# Patient Record
Sex: FEMALE | Race: WHITE | NOT HISPANIC OR LATINO | Employment: OTHER | URBAN - METROPOLITAN AREA
[De-identification: names, ages, dates, MRNs, and addresses within clinical notes are randomized per-mention and may not be internally consistent; named-entity substitution may affect disease eponyms.]

---

## 2021-02-13 ENCOUNTER — HOSPITAL ENCOUNTER (EMERGENCY)
Facility: HOSPITAL | Age: 76
Discharge: HOME | End: 2021-02-13
Attending: EMERGENCY MEDICINE | Admitting: EMERGENCY MEDICINE

## 2021-02-13 ENCOUNTER — APPOINTMENT (OUTPATIENT)
Dept: GENERAL RADIOLOGY | Facility: HOSPITAL | Age: 76
End: 2021-02-13

## 2021-02-13 VITALS
HEIGHT: 60 IN | OXYGEN SATURATION: 94 % | SYSTOLIC BLOOD PRESSURE: 146 MMHG | WEIGHT: 160 LBS | BODY MASS INDEX: 31.41 KG/M2 | HEART RATE: 88 BPM | RESPIRATION RATE: 17 BRPM | TEMPERATURE: 97.4 F | DIASTOLIC BLOOD PRESSURE: 73 MMHG

## 2021-02-13 DIAGNOSIS — S22.31XA CLOSED FRACTURE OF ONE RIB OF RIGHT SIDE, INITIAL ENCOUNTER: Primary | ICD-10-CM

## 2021-02-13 PROCEDURE — 71101 X-RAY EXAM UNILAT RIBS/CHEST: CPT

## 2021-02-13 PROCEDURE — 99282 EMERGENCY DEPT VISIT SF MDM: CPT

## 2021-02-13 NOTE — ED PROVIDER NOTES
EMERGENCY DEPARTMENT ENCOUNTER    Room Number:  39/39  Date seen:  2/13/2021  PCP: Moustapha Mata MD  Historian: patient      HPI:  Chief Complaint: right rib pain  A complete HPI/ROS/PMH/PSH/SH/FH are unobtainable due to: nothing  Context: Kristina Johnson is a 75 y.o. female who presents to the ED c/o pain in her right ribs that started last night when she was reaching down over the arm rest of her chair to get a Kleenex and felt a pop in her side.  The pain is sharp, constant.  The pain is worse with movement and with deep breathing.  She denies SOA.  Pain also worse with movement of her right arm.  She took a muscle relaxer and also a Norco 7.5 mg that she has for chronic neck pain which provided some relief of this pain.            PAST MEDICAL HISTORY  Active Ambulatory Problems     Diagnosis Date Noted   • No Active Ambulatory Problems     Resolved Ambulatory Problems     Diagnosis Date Noted   • No Resolved Ambulatory Problems     No Additional Past Medical History         PAST SURGICAL HISTORY  No past surgical history on file.      FAMILY HISTORY  No family history on file.      SOCIAL HISTORY  Social History     Socioeconomic History   • Marital status: Single     Spouse name: Not on file   • Number of children: Not on file   • Years of education: Not on file   • Highest education level: Not on file         ALLERGIES  Ace inhibitors        REVIEW OF SYSTEMS  Review of Systems   Review of all 14 systems is negative other than stated in the HPI above.      PHYSICAL EXAM  ED Triage Vitals   Temp Heart Rate Resp BP SpO2   02/13/21 1513 02/13/21 1511 02/13/21 1511 02/13/21 1531 02/13/21 1511   97.4 °F (36.3 °C) 92 18 123/66 94 %      Temp src Heart Rate Source Patient Position BP Location FiO2 (%)   02/13/21 1513 02/13/21 1531 02/13/21 1531 02/13/21 1531 --   Tympanic Monitor Lying Left arm          GENERAL: Awake and alert, no acute distress  HENT: nares patent  EYES: no scleral icterus  CV: regular  rhythm, normal rate  RESPIRATORY: normal effort, lungs clear to auscultation bilaterally  ABDOMEN: soft, nontender throughout  MUSCULOSKELETAL: no deformity.  There is point tenderness over the right fourth and fifth ribs in the midaxillary line with no soft tissue crepitance.  There is no pain with passive ranging of the right upper extremity.  NEURO: alert, moves all extremities, follows commands  PSYCH:  calm, cooperative  SKIN: warm, dry    Vital signs and nursing notes reviewed.          LAB RESULTS  No results found for this or any previous visit (from the past 24 hour(s)).    Ordered the above labs and reviewed the results.        RADIOLOGY  Xr Ribs Right With Pa Chest    Result Date: 2/13/2021  Chest radiograph  HISTORY:Right rib contusion and pain  TECHNIQUE: PA chest and multiple AP and oblique radiographs of the right ribs  COMPARISON:Chest radiograph 08/15/2012      FINDINGS AND IMPRESSION: Cervical fusion hardware is incompletely visualized. There is no pulmonary consolidation. No pneumothorax or pleural effusion is seen. There are upper quadrant surgical clips. Cardiac silhouette is within normal limits for size. Dextrocurvature the lumbar spine is present. Subtle cortical irregularity along the anterolateral aspect the right eighth rib is present and possibly representing a minimally displaced fracture. Correlation with point tenderness is recommended.  This report was finalized on 2/13/2021 4:55 PM by Dr. Bartolo Vicente M.D.        Ordered the above noted radiological studies. Reviewed by me in PACS.            PROCEDURES  Procedures            MEDICATIONS GIVEN IN ER  Medications - No data to display                MEDICAL DECISION MAKING, PROGRESS, and CONSULTS    All labs have been independently reviewed by me.  All radiology studies have been reviewed by me and discussed with radiologist dictating the report.   EKG's independently viewed and interpreted by me.  Discussion below represents my  analysis of pertinent findings related to patient's condition, differential diagnosis, treatment plan and final disposition.    Differential diagnosis includes intercostal muscle strain, rib fracture, pneumothorax, hemothorax.    ED Course as of Feb 13 1859   Sat Feb 13, 2021   1736 Patient with a single right rib fracture.  There is no evidence of consolidation on the rib series.  She will be provided with a incentive spirometer.  She already has Norco at home.  Instructed to take NSAIDs in addition as needed for pain.  Return precautions were discussed.    [JR]      ED Course User Index  [JR] Jaspreet Henley MD              I wore an N95 mask, face shield, and gloves during this patient encounter.  Patient also wearing a surgical mask.  Hand hygeine performed before and after seeing the patient.    DIAGNOSIS  Final diagnoses:   Closed fracture of one rib of right side, initial encounter         DISPOSITION  DISCHARGE    Patient discharged in stable condition.    Reviewed implications of results, diagnosis, meds, responsibility to follow up, warning signs and symptoms of possible worsening, potential complications and reasons to return to ER.    Patient/Family voiced understanding of above instructions.    Discussed plan for discharge, as there is no emergent indication for admission. Patient referred to primary care provider for BP management due to today's BP. Pt/family is agreeable and understands need for follow up and repeat testing.  Pt is aware that discharge does not mean that nothing is wrong but it indicates no emergency is present that requires admission and they must continue care with follow-up as given below or physician of their choice.     FOLLOW-UP  Moustapha Mata MD  [address] [id number] [phone]    Schedule an appointment as soon as possible for a visit            Medication List      No changes were made to your prescriptions during this visit.                    Latest Documented Vital Signs:  As of 18:59 EST  BP- 146/73 HR- 88 Temp- 97.4 °F (36.3 °C) (Tympanic) O2 sat- 94%        --    Please note that portions of this were completed with a voice recognition program.          Jaspreet Henley MD  02/13/21 9805

## 2021-02-13 NOTE — DISCHARGE INSTRUCTIONS
Continue to take your home pain medication.  You may also take ibuprofen in addition if needed.  Take deep breaths at least once an hour to reduce risk of pneumonia.  Return to the ER for shortness of breath or fever.

## 2021-02-13 NOTE — ED TRIAGE NOTES
Pt reports right shoulder pain after reaching down last night for kleenex and felt a pop. Pt states pain radiates into rt chest. Pt states pain worse when moving arm. Patient masked in first look triage. I was wearing mask and goggles.

## 2021-04-11 ENCOUNTER — HOSPITAL ENCOUNTER (EMERGENCY)
Facility: HOSPITAL | Age: 76
Discharge: HOME | End: 2021-04-11
Attending: EMERGENCY MEDICINE | Admitting: EMERGENCY MEDICINE

## 2021-04-11 VITALS
HEIGHT: 60 IN | OXYGEN SATURATION: 94 % | RESPIRATION RATE: 18 BRPM | TEMPERATURE: 97.6 F | HEART RATE: 85 BPM | BODY MASS INDEX: 31.25 KG/M2

## 2021-04-11 DIAGNOSIS — H81.12 BENIGN PAROXYSMAL POSITIONAL VERTIGO OF LEFT EAR: Primary | ICD-10-CM

## 2021-04-11 PROCEDURE — 99283 EMERGENCY DEPT VISIT LOW MDM: CPT

## 2021-04-11 PROCEDURE — 63710000001 DEXAMETHASONE PER 0.25 MG: Performed by: EMERGENCY MEDICINE

## 2021-04-11 RX ORDER — MECLIZINE HYDROCHLORIDE 25 MG/1
TABLET ORAL
Qty: 30 TABLET | Refills: 0 | OUTPATIENT
Start: 2021-04-11

## 2021-04-11 RX ORDER — MECLIZINE HYDROCHLORIDE 25 MG/1
25 TABLET ORAL ONCE
Status: COMPLETED | OUTPATIENT
Start: 2021-04-11 | End: 2021-04-11

## 2021-04-11 RX ORDER — DEXAMETHASONE 4 MG/1
8 TABLET ORAL ONCE
Status: COMPLETED | OUTPATIENT
Start: 2021-04-11 | End: 2021-04-11

## 2021-04-11 RX ORDER — MECLIZINE HYDROCHLORIDE 25 MG/1
TABLET ORAL
Qty: 30 TABLET | Refills: 0 | OUTPATIENT
Start: 2021-04-11 | End: 2021-04-11 | Stop reason: SDUPTHER

## 2021-04-11 RX ADMIN — DEXAMETHASONE 8 MG: 4 TABLET ORAL at 17:24

## 2021-04-11 RX ADMIN — MECLIZINE HYDROCHLORIDE 25 MG: 25 TABLET ORAL at 17:24

## 2021-04-11 NOTE — ED PROVIDER NOTES
EMERGENCY DEPARTMENT ENCOUNTER    Room Number:  10/10  Date of encounter:  4/11/2021  PCP: Moustapha Mata MD  Historian: Patient    Patient was placed in face mask during triage process. Patient was wearing facemask when I entered the room and throughout our encounter. I wore full protective equipment throughout this patient encounter including a face mask, eye protection, and gloves. Hand hygiene was performed before donning protective equipment and again following doffing of PPE after leaving the room.    HPI:  Chief Complaint: Dizziness  A complete HPI/ROS/PMH/PSH/SH/FH are unobtainable due to: N/A   Context: Kristina Johnson is a 76 y.o. female who presents to the ED c/o intermittent dizziness with rapid rotation of the head or rapid change in position.  Patient notes over the last 2 days she has had gradual and intermittent dizziness where the world spins a little bit when she moves her head rapidly.  Patient has a history of recurrence of these episodes especially in the spring when her allergies kick up.  Patient has noted some full sensation in her left ear greater than right but denies any headache, significant hearing change, vision change, focal numbness or weakness.  Patient is able to ambulate with stable gait at home and in fact drove herself to the emergency department for evaluation today.  She notes they usually give her an oral tablet and this helps.  She denies any cough, fever, dyspnea, chest pain, vomiting, abdominal pain, dysuria, hematuria, black or bloody stools.      MEDICAL HISTORY REVIEW  Seen in this emergency department 2/13/2021 and diagnosed with closed rib fractures.    PAST MEDICAL HISTORY  Active Ambulatory Problems     Diagnosis Date Noted   • No Active Ambulatory Problems     Resolved Ambulatory Problems     Diagnosis Date Noted   • No Resolved Ambulatory Problems     No Additional Past Medical History         PAST SURGICAL HISTORY  No past surgical history on file.      FAMILY  HISTORY  No family history on file.      SOCIAL HISTORY  Social History     Socioeconomic History   • Marital status: Single     Spouse name: Not on file   • Number of children: Not on file   • Years of education: Not on file   • Highest education level: Not on file         ALLERGIES  Ace inhibitors        REVIEW OF SYSTEMS  Review of Systems     All systems reviewed and negative except for those discussed in HPI.       PHYSICAL EXAM    I have reviewed the triage vital signs and nursing notes.    ED Triage Vitals [04/11/21 1553]   Temp Heart Rate Resp BP SpO2   97.6 °F (36.4 °C) 85 18 -- 94 %      Temp src Heart Rate Source Patient Position BP Location FiO2 (%)   -- -- -- -- --       Physical Exam    Physical Exam   Constitutional: No distress.   HENT: Bilateral serous otitis that is very mild noted.  No distention of the TMs or erythema.  Head: Normocephalic and atraumatic.   Oropharynx: Mucous membranes are moist.   Eyes: No scleral icterus. No conjunctival pallor.  Smooth pursuit.  Neck: Painless range of motion noted. Neck supple.   Cardiovascular: Normal rate, regular rhythm and intact distal pulses.  Pulmonary/Chest: No respiratory distress. There are no wheezes, no rhonchi, and no rales.   Abdominal: Soft. There is no tenderness. There is no rebound and no guarding.   Musculoskeletal: Moves all extremities equally. There is no pedal edema or calf tenderness.   Neurological: Alert.  Baseline strength and sensation noted.   NIH Stroke Scale      Interval: Baseline  Time: 16:48 EDT  Person Administering Scale: Aubrey Simposn MD    Administer stroke scale items in the order listed. Record performance in each category after each subscale exam. Do not go back and change scores. Follow directions provided for each exam technique. Scores should reflect what the patient does, not what the clinician thinks the patient can do. The clinician should record answers while administering the exam and work quickly. Except  where indicated, the patient should not be coached (i.e., repeated requests to patient to make a special effort).      1a  Level of consciousness: 0=alert; keenly responsive   1b. LOC questions:  0=Performs both tasks correctly   1c. LOC commands: 0=Performs both tasks correctly   2.  Best Gaze: 0=normal   3.  Visual: 0=No visual loss   4. Facial Palsy: 0=Normal symmetric movement   5a.  Motor left arm: 0=No drift, limb holds 90 (or 45) degrees for full 10 seconds   5b.  Motor right arm: 0=No drift, limb holds 90 (or 45) degrees for full 10 seconds   6a. motor left le=No drift, limb holds 90 (or 45) degrees for full 10 seconds   6b  Motor right le=No drift, limb holds 90 (or 45) degrees for full 10 seconds   7. Limb Ataxia: 0=Absent   8.  Sensory: 0=Normal; no sensory loss   9. Best Language:  0=No aphasia, normal   10. Dysarthria: 0=Normal   11. Extinction and Inattention: 0=No abnormality   12. Distal motor function: 0=Normal    Total:   0     Skin: Skin is pink, warm, and dry. No pallor.   Psychiatric: Mood and affect normal.   Nursing note and vitals reviewed.    LAB RESULTS  No results found for this or any previous visit (from the past 24 hour(s)).    Ordered the above labs and independently reviewed the results.        RADIOLOGY  No Radiology Exams Resulted Within Past 24 Hours    I ordered the above noted radiological studies. Reviewed by me and discussed with radiologist.  See dictation for official radiology interpretation.      PROCEDURES    Procedures        MEDICATIONS GIVEN IN ER    Medications   dexamethasone (DECADRON) tablet 8 mg (has no administration in time range)   meclizine (ANTIVERT) tablet 25 mg (has no administration in time range)         PROGRESS, DATA ANALYSIS, CONSULTS, AND MEDICAL DECISION MAKING    My differential diagnosis for dizziness included but is not limited to:  Labyrinthitis, vertigo, concussion, intracranial hemorrhage, stroke, migraine headache, cardiac arrhythmias,  acute MI, hypotension, hypertension, hypoxia, inner ear and middle ear infections, anemia, electrolyte abnormalities, dehydration, hyperventilation and anxiety attacks..      All labs have been independently reviewed by me.  All radiology studies have been reviewed by me and discussed with radiologist dictating the report.   EKG's independently viewed and interpreted by me.  Discussion below represents my analysis of pertinent findings related to patient's condition, differential diagnosis, treatment plan and final disposition.      ED Course as of Apr 11 1648   Sun Apr 11, 2021   1626 Patient with very mild symptoms at this time.  No indication for laboratory or radiologic evaluation.  ENT follow-up recommended.  Patient agreeable.    [RS]      ED Course User Index  [RS] Aubrey Simpson MD       AS OF 16:48 EDT VITALS:    BP -    HR - 85  TEMP - 97.6 °F (36.4 °C)  O2 SATS - 94%        DIAGNOSIS  Final diagnoses:   Benign paroxysmal positional vertigo of left ear         DISPOSITION  DISCHARGE    Patient discharged in stable condition.    Reviewed implications of results, diagnosis, meds, responsibility to follow up, warning signs and symptoms of possible worsening, potential complications and reasons to return to ER.    Patient/Family voiced understanding of above instructions.    Discussed plan for discharge, as there is no emergent indication for admission. Patient referred to primary care provider for regular health maintenance. Pt/family is agreeable and understands need for follow up and possible repeat testing.  Pt is aware that discharge does not mean that nothing is wrong but it indicates no emergency is present that requires admission and they must continue care with follow-up as given below or physician of their choice.     FOLLOW-UP  Moustapha Mata MD  [address] [id number] [phone]    Schedule an appointment as soon as possible for a visit   As needed    Jeannine Benedict  MD YARIEL  [address] [facility] [address]  [phone]    Schedule an appointment as soon as possible for a visit in 2 days  For further evaluation and treatment of your positional vertigo         Medication List      New Prescriptions    meclizine 25 MG tablet  Commonly known as: ANTIVERT  Take 1 tablet by mouth 3 times daily as needed for dizziness or nausea           Where to Get Your Medications      You can get these medications from any pharmacy    Bring a paper prescription for each of these medications  · meclizine 25 MG tablet            Aubrey Simpson MD  04/11/21 4238

## 2021-04-11 NOTE — ED TRIAGE NOTES
Pt comes to ER for dizziness x2 days that is made worse with movement and standing up. Pt denies any blurred vision, slurred speech, or headache. Pt and RN wearing mask upon triage.

## 2023-06-11 ENCOUNTER — APPOINTMENT (OUTPATIENT)
Dept: GENERAL RADIOLOGY | Facility: HOSPITAL | Age: 78
End: 2023-06-11
Payer: MEDICARE

## 2023-06-11 ENCOUNTER — APPOINTMENT (OUTPATIENT)
Dept: CT IMAGING | Facility: HOSPITAL | Age: 78
End: 2023-06-11
Payer: MEDICARE

## 2023-06-11 ENCOUNTER — HOSPITAL ENCOUNTER (EMERGENCY)
Facility: HOSPITAL | Age: 78
Discharge: HOME | End: 2023-06-11
Attending: EMERGENCY MEDICINE | Admitting: EMERGENCY MEDICINE
Payer: MEDICARE

## 2023-06-11 VITALS
WEIGHT: 150 LBS | BODY MASS INDEX: 28.32 KG/M2 | DIASTOLIC BLOOD PRESSURE: 86 MMHG | SYSTOLIC BLOOD PRESSURE: 159 MMHG | TEMPERATURE: 99.1 F | HEIGHT: 61 IN | OXYGEN SATURATION: 96 % | HEART RATE: 85 BPM | RESPIRATION RATE: 18 BRPM

## 2023-06-11 DIAGNOSIS — S16.1XXA STRAIN OF NECK MUSCLE, INITIAL ENCOUNTER: ICD-10-CM
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
## 2023-06-11 DIAGNOSIS — S46.912A STRAIN OF LEFT SHOULDER, INITIAL ENCOUNTER: ICD-10-CM

## 2023-06-11 LAB
ANION GAP SERPL CALCULATED.3IONS-SCNC: 7 MMOL/L (ref 5–15)
BASOPHILS # BLD AUTO: 0.03 10*3/MM3 (ref 0–0.2)
BASOPHILS NFR BLD AUTO: 0.3 % (ref 0–1.5)
BUN SERPL-MCNC: 22 MG/DL (ref 8–23)
BUN/CREAT SERPL: 18 (ref 7–25)
CALCIUM SPEC-SCNC: 9.2 MG/DL (ref 8.6–10.5)
CHLORIDE SERPL-SCNC: 110 MMOL/L (ref 98–107)
CO2 SERPL-SCNC: 28 MMOL/L (ref 22–29)
CREAT SERPL-MCNC: 1.22 MG/DL (ref 0.57–1)
DEPRECATED RDW RBC AUTO: 43 FL (ref 37–54)
EGFRCR SERPLBLD CKD-EPI 2021: 46.1 ML/MIN/1.73
EOSINOPHIL # BLD AUTO: 0.19 10*3/MM3 (ref 0–0.4)
EOSINOPHIL NFR BLD AUTO: 2.2 % (ref 0.3–6.2)
ERYTHROCYTE [DISTWIDTH] IN BLOOD BY AUTOMATED COUNT: 12.3 % (ref 12.3–15.4)
GLUCOSE SERPL-MCNC: 150 MG/DL (ref 65–99)
HCT VFR BLD AUTO: 38.9 % (ref 34–46.6)
HGB BLD-MCNC: 12.5 G/DL (ref 12–15.9)
IMM GRANULOCYTES # BLD AUTO: 0.15 10*3/MM3 (ref 0–0.05)
IMM GRANULOCYTES NFR BLD AUTO: 1.7 % (ref 0–0.5)
LYMPHOCYTES # BLD AUTO: 2.16 10*3/MM3 (ref 0.7–3.1)
LYMPHOCYTES NFR BLD AUTO: 25.1 % (ref 19.6–45.3)
MCH RBC QN AUTO: 30.3 PG (ref 26.6–33)
MCHC RBC AUTO-ENTMCNC: 32.1 G/DL (ref 31.5–35.7)
MCV RBC AUTO: 94.4 FL (ref 79–97)
MONOCYTES # BLD AUTO: 0.51 10*3/MM3 (ref 0.1–0.9)
MONOCYTES NFR BLD AUTO: 5.9 % (ref 5–12)
NEUTROPHILS NFR BLD AUTO: 5.55 10*3/MM3 (ref 1.7–7)
NEUTROPHILS NFR BLD AUTO: 64.8 % (ref 42.7–76)
NRBC BLD AUTO-RTO: 0 /100 WBC (ref 0–0.2)
PLATELET # BLD AUTO: 193 10*3/MM3 (ref 140–450)
PMV BLD AUTO: 10.6 FL (ref 6–12)
POTASSIUM SERPL-SCNC: 4.6 MMOL/L (ref 3.5–5.2)
QT INTERVAL: 351 MS
RBC # BLD AUTO: 4.12 10*6/MM3 (ref 3.77–5.28)
SODIUM SERPL-SCNC: 145 MMOL/L (ref 136–145)
TROPONIN T SERPL HS-MCNC: 13 NG/L
TROPONIN T SERPL HS-MCNC: 13 NG/L
WBC NRBC COR # BLD: 8.59 10*3/MM3 (ref 3.4–10.8)

## 2023-06-11 PROCEDURE — 73590 X-RAY EXAM OF LOWER LEG: CPT

## 2023-06-11 PROCEDURE — 70450 CT HEAD/BRAIN W/O DYE: CPT

## 2023-06-11 PROCEDURE — 73030 X-RAY EXAM OF SHOULDER: CPT

## 2023-06-11 PROCEDURE — 93010 ELECTROCARDIOGRAM REPORT: CPT | Performed by: INTERNAL MEDICINE

## 2023-06-11 PROCEDURE — 72125 CT NECK SPINE W/O DYE: CPT

## 2023-06-11 PROCEDURE — 99283 EMERGENCY DEPT VISIT LOW MDM: CPT

## 2023-06-11 PROCEDURE — 93005 ELECTROCARDIOGRAM TRACING: CPT | Performed by: EMERGENCY MEDICINE

## 2023-06-11 PROCEDURE — 99282 EMERGENCY DEPT VISIT SF MDM: CPT

## 2023-06-11 PROCEDURE — 36415 COLL VENOUS BLD VENIPUNCTURE: CPT

## 2023-06-11 PROCEDURE — 84484 ASSAY OF TROPONIN QUANT: CPT | Performed by: EMERGENCY MEDICINE

## 2023-06-11 PROCEDURE — 80048 BASIC METABOLIC PNL TOTAL CA: CPT | Performed by: EMERGENCY MEDICINE

## 2023-06-11 PROCEDURE — 85025 COMPLETE CBC W/AUTO DIFF WBC: CPT | Performed by: EMERGENCY MEDICINE

## 2023-06-11 RX ORDER — LOSARTAN POTASSIUM 100 MG/1
TABLET ORAL
COMMUNITY

## 2023-06-11 RX ORDER — ATORVASTATIN CALCIUM 20 MG/1
TABLET, FILM COATED ORAL
COMMUNITY
Start: 2021-04-09

## 2023-06-11 RX ORDER — DAPAGLIFLOZIN 10 MG/1
TABLET, FILM COATED ORAL
COMMUNITY

## 2023-06-11 RX ORDER — CLONAZEPAM 1 MG/1
TABLET ORAL
COMMUNITY

## 2023-06-11 RX ORDER — CELECOXIB 200 MG/1
CAPSULE ORAL
COMMUNITY

## 2023-06-11 RX ORDER — BACLOFEN 10 MG/1
TABLET ORAL
COMMUNITY

## 2023-06-11 RX ORDER — DULOXETIN HYDROCHLORIDE 30 MG/1
CAPSULE, DELAYED RELEASE ORAL
COMMUNITY

## 2023-06-11 RX ORDER — GABAPENTIN 800 MG/1
TABLET ORAL
COMMUNITY

## 2023-06-11 RX ORDER — CEPHALEXIN 500 MG/1
CAPSULE ORAL
COMMUNITY
End: 2023-06-13

## 2023-06-11 RX ORDER — SULFAMETHOXAZOLE AND TRIMETHOPRIM 800; 160 MG/1; MG/1
TABLET ORAL
COMMUNITY
End: 2023-06-13

## 2023-06-11 RX ORDER — METFORMIN HYDROCHLORIDE 500 MG/1
TABLET, EXTENDED RELEASE ORAL
COMMUNITY

## 2023-06-11 RX ORDER — RALOXIFENE HYDROCHLORIDE 60 MG/1
TABLET, FILM COATED ORAL
COMMUNITY

## 2023-06-11 NOTE — ED NOTES
Pt arrives ambulatory to triage for a fall yesterday and a fall earlier in the week. Pt fell the first time because she was dizzy. Pt fell the second time while she was trying to tie her shoes. Pt reports hitting her head both times and reports neck pain. Pt denies LOC or blood thinners.

## 2023-06-11 NOTE — ED PROVIDER NOTES
EMERGENCY DEPARTMENT ENCOUNTER    Room Number:  13/13  Date seen:  6/11/2023  PCP: Moustapha Mata MD      HPI:  Chief Complaint: Fall  A complete HPI/ROS/PMH/PSH/SH/FH are unobtainable due to: Amnesia to the events  Context: Kristina Johnson is a 78 y.o. female who presents to the ED c/o fall.  She had 2 falls.  First fall was about a week ago and second fall was yesterday.  The first fall, she was looking up when she became lightheaded and then hit her head.  During the fall yesterday she bent over to get her shoes.  She does not recall the events but does not think she had any prodromal symptoms.  She states that she then fell forward onto the ground.  She complains of head pain as well as right neck pain.  She also notes that she has bruising to her left shoulder and skinned up her bilateral knees.          PAST MEDICAL HISTORY  Active Ambulatory Problems     Diagnosis Date Noted    No Active Ambulatory Problems     Resolved Ambulatory Problems     Diagnosis Date Noted    No Resolved Ambulatory Problems     Past Medical History:   Diagnosis Date    Diabetes mellitus          PAST SURGICAL HISTORY  History reviewed. No pertinent surgical history.      FAMILY HISTORY  History reviewed. No pertinent family history.      SOCIAL HISTORY  Social History     Socioeconomic History    Marital status: Single         ALLERGIES  Codeine and Ace inhibitors        REVIEW OF SYSTEMS  Review of Systems     All systems reviewed and negative except for those discussed in HPI.       PHYSICAL EXAM  ED Triage Vitals [06/11/23 1616]   Temp Heart Rate Resp BP SpO2   99.1 °F (37.3 °C) 97 18 128/94 97 %      Temp src Heart Rate Source Patient Position BP Location FiO2 (%)   Oral Monitor -- -- --       Physical Exam      GENERAL: no acute distress  HENT: nares patent  EYES: no scleral icterus  CV: regular rhythm, normal rate  RESPIRATORY: normal effort  ABDOMEN: soft, nontender  MUSCULOSKELETAL: no deformity, no C/T/L-spine tenderness,  she does have pain to the right lateral neck musculature that is very superficial, no pain to palpation of the 4 extremities, no chest wall tenderness  NEURO: alert, moves all extremities, follows commands  PSYCH:  calm, cooperative  SKIN: warm, dry, bruising to the left shoulder    Vital signs and nursing notes reviewed.          LAB RESULTS  Recent Results (from the past 24 hour(s))   Basic Metabolic Panel    Collection Time: 06/11/23  6:06 PM    Specimen: Blood   Result Value Ref Range    Glucose 150 (H) 65 - 99 mg/dL    BUN 22 8 - 23 mg/dL    Creatinine 1.22 (H) 0.57 - 1.00 mg/dL    Sodium 145 136 - 145 mmol/L    Potassium 4.6 3.5 - 5.2 mmol/L    Chloride 110 (H) 98 - 107 mmol/L    CO2 28.0 22.0 - 29.0 mmol/L    Calcium 9.2 8.6 - 10.5 mg/dL    BUN/Creatinine Ratio 18.0 7.0 - 25.0    Anion Gap 7.0 5.0 - 15.0 mmol/L    eGFR 46.1 (L) >60.0 mL/min/1.73   Single High Sensitivity Troponin T    Collection Time: 06/11/23  6:06 PM    Specimen: Blood   Result Value Ref Range    HS Troponin T 13 (H) <10 ng/L   High Sensitivity Troponin T    Collection Time: 06/11/23  6:06 PM    Specimen: Blood   Result Value Ref Range    HS Troponin T 13 (H) <10 ng/L   CBC Auto Differential    Collection Time: 06/11/23  6:06 PM    Specimen: Blood   Result Value Ref Range    WBC 8.59 3.40 - 10.80 10*3/mm3    RBC 4.12 3.77 - 5.28 10*6/mm3    Hemoglobin 12.5 12.0 - 15.9 g/dL    Hematocrit 38.9 34.0 - 46.6 %    MCV 94.4 79.0 - 97.0 fL    MCH 30.3 26.6 - 33.0 pg    MCHC 32.1 31.5 - 35.7 g/dL    RDW 12.3 12.3 - 15.4 %    RDW-SD 43.0 37.0 - 54.0 fl    MPV 10.6 6.0 - 12.0 fL    Platelets 193 140 - 450 10*3/mm3    Neutrophil % 64.8 42.7 - 76.0 %    Lymphocyte % 25.1 19.6 - 45.3 %    Monocyte % 5.9 5.0 - 12.0 %    Eosinophil % 2.2 0.3 - 6.2 %    Basophil % 0.3 0.0 - 1.5 %    Immature Grans % 1.7 (H) 0.0 - 0.5 %    Neutrophils, Absolute 5.55 1.70 - 7.00 10*3/mm3    Lymphocytes, Absolute 2.16 0.70 - 3.10 10*3/mm3    Monocytes, Absolute 0.51 0.10 -  0.90 10*3/mm3    Eosinophils, Absolute 0.19 0.00 - 0.40 10*3/mm3    Basophils, Absolute 0.03 0.00 - 0.20 10*3/mm3    Immature Grans, Absolute 0.15 (H) 0.00 - 0.05 10*3/mm3    nRBC 0.0 0.0 - 0.2 /100 WBC   ECG 12 Lead Syncope    Collection Time: 06/11/23  6:12 PM   Result Value Ref Range    QT Interval 351 ms       Ordered the above labs and reviewed the results.        RADIOLOGY  XR Shoulder 2+ View Left, XR Tibia Fibula 2 View Left    Result Date: 6/11/2023  XR TIBIA FIBULA 2 VW LEFT-, XR SHOULDER 2+ VW LEFT-  Clinical: Fell, pain  Left tibia/fibula findings: Substantial right knee joint degeneration. The ankle joint is preserved. No acute osseous abnormality of the tibia/fibula. No soft tissue gas nor radiopaque foreign body seen.  CONCLUSION: No acute osseous abnormality  Left shoulder findings: Acromioclavicular and glenohumeral joints have a satisfactory appearance, no shoulder fracture nor dislocation seen. The adjacent ribs and overlying soft tissues are unremarkable.  CONCLUSION: No acute osseous nor articular abnormality.  This report was finalized on 6/11/2023 6:15 PM by Dr. John Alcaraz M.D.      CT Head Without Contrast    Result Date: 6/11/2023  CT OF HEAD WITHOUT CONTRAST  HISTORY: Fall  COMPARISON: None available.  TECHNIQUE: Axial CT imaging was obtained through the brain. No IV contrast was administered.  FINDINGS: No acute intracranial hemorrhage is seen. There is atrophy. There is periventricular and deep white matter microangiopathic change. There is no midline shift or mass effect. No calvarial fracture is seen. There is mucosal thickening within the right ethmoid sinus.      No acute intracranial findings.  Radiation dose reduction techniques were utilized, including automated exposure control and exposure modulation based on body size.  This report was finalized on 6/11/2023 7:23 PM by Dr. Ernestina Valenzuela M.D.      CT Cervical Spine Without Contrast    Result Date: 6/11/2023  CT OF THE  CERVICAL SPINE  HISTORY: Neck pain after a fall  COMPARISON: None available.  TECHNIQUE: Axial CT imaging was obtained through the cervical spine. Coronal and sagittal reformatted images were obtained.  FINDINGS: This examination is difficult to interpret given the presence of extensive postsurgical changes of the cervical spine, without prior studies for comparison. No obvious acute fracture or subluxation is identified. The patient does have some mild retrolisthesis of C3 on C4, an additional anterolisthesis of C4 on C5. Patient appears to be status post cervical spinal fusion at C5-C7, with additional posterior spinal rods noted bilaterally, extending from C3 through C7. There patient is also status post bilateral laminectomies at C3-C4 and C4-C5. There is no prevertebral soft tissue swelling. Images through the lung apices demonstrate right apical scarring. Vertebral body hemangioma is noted at C2. No canal stenosis is noted at any level. There is no prevertebral swelling.  C2-C3: There is moderate neural foraminal narrowing on the left. C3-C4: There is severe neural foraminal narrowing on the left. C4-C5: There is some minimal neural foraminal narrowing on the left. C5-C6: There is no significant canal stenosis or neural foraminal narrowing. C6-C7: There is no significant canal stenosis or neural foraminal narrowing. C7-T1: There is mild canal narrowing. There is mild neural foraminal narrowing on the left.       1. Exam is difficult to interpret given the presence of extensive postsurgical change of the cervical spine, in the absence of any prior studies for comparison. An obvious acute fracture or subluxation is not seen.  Radiation dose reduction techniques were utilized, including automated exposure control and exposure modulation based on body size.  This report was finalized on 6/11/2023 7:32 PM by Dr. Ernestina Valenzuela M.D.       Ordered the above noted radiological studies. Reviewed by me in PACS.           PROCEDURES  Procedures          MEDICATIONS GIVEN IN ER  Medications - No data to display      MEDICAL DECISION MAKING, PROGRESS, and CONSULTS    Discussion below represents my analysis of pertinent findings related to patient's condition, differential diagnosis, treatment plan and final disposition.      Orders placed during this visit:  Orders Placed This Encounter   Procedures    CT Head Without Contrast    CT Cervical Spine Without Contrast    XR Shoulder 2+ View Left    XR Tibia Fibula 2 View Left    Basic Metabolic Panel    Single High Sensitivity Troponin T    High Sensitivity Troponin T    CBC Auto Differential    ECG 12 Lead Syncope    CBC & Differential         Additional sources:  - Discussed/obtained information from independent historians: Family member at bedside  Additional information was obtained to confirm the patient's history.    - External (non-ED) record review: See ED course below                Differential diagnosis:    Cranial hemorrhage, C-spine fracture, fracture or dislocation in her shoulder, cardiac arrhythmia    After initial evaluation, I considered the need for admission due to the possibility of intracranial hemorrhage given her 2 falls to her head.  Therefore, CT imaging ordered.      Independent interpretation of labs, radiology studies, and discussions with consultants:  ED Course as of 06/11/23 2002   Sun Jun 11, 2023 1819 EKG independently interpreted by myself.  Time 6:12 PM.  Sinus rhythm.  Heart rate 82.  Premature atrial contractions.  No acute ST abnormality. [TD]   1852 HS Troponin T(!): 13 [TD]   1852 Creatinine(!): 1.22 [TD]   1852 Sodium: 145 [TD]   1852 Potassium: 4.6 [TD]   1852 I reviewed outside records from 5/8/2023 from [facility].  She had a normal stress test at that point.   [TD]   1852 Patient presents with a fall.  I am evaluating first for traumatic complications from her fall.  Additionally, given the uncertainty around a possible near  syncopal or syncopal event from her fall yesterday, I am also looking for an inciting etiology.  Therefore, blood work and EKG have been obtained.  She did have a recent stress test that was negative.  She has had no chest pain or shortness of breath today.  I have very low suspicion for pulm embolism.   [TD]   1932 With a recent negative stress test, I do not believe the patient needs a repeat troponin. [TD]   1958 X-ray of the left shoulder independently interpreted myself.  No fracture or dislocation. [TD]      ED Course User Index  [TD] Zachary Burgos II, MD         Thankfully, imaging is acutely negative.  She is feeling well.  I think that she is safe for discharge home.  See no traumatic issues.  Additionally, I do not see anything like any significant electrolyte abnormalities or evidence of cardiac arrhythmia that would require admission for further diagnostic work-up to identify the etiology.  This seems to be most likely related to being lightheaded from lifting her head up a week ago as well as from bending over to get her shoes.      DIAGNOSIS  Final diagnoses:   Fall, initial encounter   Strain of neck muscle, initial encounter   Strain of left shoulder, initial encounter         DISPOSITION  DISCHARGE    FOLLOW-UP  Moustapha Mata MD  [address] [address]  [phone]    Schedule an appointment as soon as possible for a visit   As needed         Medication List      No changes were made to your prescriptions during this visit.             Latest Documented Vital Signs:  As of 20:02 EDT  BP- 128/94 HR- 97 Temp- 99.1 °F (37.3 °C) (Oral) O2 sat- 97%      --    Please note that portions of this were completed with a voice recognition program.       Note Disclaimer: At [facility], we believe that sharing information builds trust and better relationships. You are receiving this note because you are receiving care at [facility] or recently visited. It is possible  you will see health information before a provider has talked with you about it. This kind of information can be easy to misunderstand. To help you fully understand what it means for your health, we urge you to discuss this note with your provider.         Zachary Burgos II, MD  06/11/23 2002

## 2023-06-12 ENCOUNTER — APPOINTMENT (OUTPATIENT)
Dept: GENERAL RADIOLOGY | Facility: HOSPITAL | Age: 78
End: 2023-06-12
Payer: MEDICARE

## 2023-06-12 ENCOUNTER — HOSPITAL ENCOUNTER (EMERGENCY)
Facility: HOSPITAL | Age: 78
Discharge: HOME | End: 2023-06-13
Attending: EMERGENCY MEDICINE | Admitting: EMERGENCY MEDICINE
Payer: MEDICARE

## 2023-06-12 ENCOUNTER — APPOINTMENT (OUTPATIENT)
Dept: CT IMAGING | Facility: HOSPITAL | Age: 78
End: 2023-06-12
Payer: MEDICARE

## 2023-06-12 DIAGNOSIS — K86.9 PANCREATIC LESION: ICD-10-CM

## 2023-06-12 DIAGNOSIS — S30.1XXA CONTUSION OF ABDOMINAL WALL, INITIAL ENCOUNTER: ICD-10-CM

## 2023-06-12 DIAGNOSIS — R74.8 ELEVATED LIPASE: Primary | ICD-10-CM

## 2023-06-12 DIAGNOSIS — S09.90XA TRAUMATIC INJURY OF HEAD, INITIAL ENCOUNTER: ICD-10-CM

## 2023-06-12 DIAGNOSIS — S20.219A CHEST WALL CONTUSION, UNSPECIFIED LATERALITY, INITIAL ENCOUNTER: ICD-10-CM
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
## 2023-06-12 LAB
ALBUMIN SERPL-MCNC: 3.6 G/DL (ref 3.5–5.2)
ALBUMIN/GLOB SERPL: 1.6 G/DL
ALP SERPL-CCNC: 38 U/L (ref 39–117)
ALT SERPL W P-5'-P-CCNC: 29 U/L (ref 1–33)
ANION GAP SERPL CALCULATED.3IONS-SCNC: 9 MMOL/L (ref 5–15)
AST SERPL-CCNC: 24 U/L (ref 1–32)
BACTERIA UR QL AUTO: ABNORMAL /HPF
BASOPHILS # BLD AUTO: 0.04 10*3/MM3 (ref 0–0.2)
BASOPHILS NFR BLD AUTO: 0.4 % (ref 0–1.5)
BILIRUB SERPL-MCNC: 0.3 MG/DL (ref 0–1.2)
BILIRUB UR QL STRIP: NEGATIVE
BUN SERPL-MCNC: 16 MG/DL (ref 8–23)
BUN/CREAT SERPL: 15 (ref 7–25)
CALCIUM SPEC-SCNC: 9.7 MG/DL (ref 8.6–10.5)
CHLORIDE SERPL-SCNC: 109 MMOL/L (ref 98–107)
CLARITY UR: CLEAR
CO2 SERPL-SCNC: 27 MMOL/L (ref 22–29)
COLOR UR: YELLOW
CREAT SERPL-MCNC: 1.07 MG/DL (ref 0.57–1)
DEPRECATED RDW RBC AUTO: 40.3 FL (ref 37–54)
EGFRCR SERPLBLD CKD-EPI 2021: 53.9 ML/MIN/1.73
EOSINOPHIL # BLD AUTO: 0.15 10*3/MM3 (ref 0–0.4)
EOSINOPHIL NFR BLD AUTO: 1.3 % (ref 0.3–6.2)
ERYTHROCYTE [DISTWIDTH] IN BLOOD BY AUTOMATED COUNT: 12.2 % (ref 12.3–15.4)
GLOBULIN UR ELPH-MCNC: 2.3 GM/DL
GLUCOSE SERPL-MCNC: 124 MG/DL (ref 65–99)
GLUCOSE UR STRIP-MCNC: ABNORMAL MG/DL
HCT VFR BLD AUTO: 36.3 % (ref 34–46.6)
HGB BLD-MCNC: 12.3 G/DL (ref 12–15.9)
HGB UR QL STRIP.AUTO: NEGATIVE
HYALINE CASTS UR QL AUTO: ABNORMAL /LPF
IMM GRANULOCYTES # BLD AUTO: 0.18 10*3/MM3 (ref 0–0.05)
IMM GRANULOCYTES NFR BLD AUTO: 1.6 % (ref 0–0.5)
KETONES UR QL STRIP: ABNORMAL
LEUKOCYTE ESTERASE UR QL STRIP.AUTO: ABNORMAL
LIPASE SERPL-CCNC: 109 U/L (ref 13–60)
LYMPHOCYTES # BLD AUTO: 1.88 10*3/MM3 (ref 0.7–3.1)
LYMPHOCYTES NFR BLD AUTO: 16.6 % (ref 19.6–45.3)
MCH RBC QN AUTO: 31.1 PG (ref 26.6–33)
MCHC RBC AUTO-ENTMCNC: 33.9 G/DL (ref 31.5–35.7)
MCV RBC AUTO: 91.7 FL (ref 79–97)
MONOCYTES # BLD AUTO: 0.65 10*3/MM3 (ref 0.1–0.9)
MONOCYTES NFR BLD AUTO: 5.7 % (ref 5–12)
NEUTROPHILS NFR BLD AUTO: 74.4 % (ref 42.7–76)
NEUTROPHILS NFR BLD AUTO: 8.45 10*3/MM3 (ref 1.7–7)
NITRITE UR QL STRIP: POSITIVE
NRBC BLD AUTO-RTO: 0 /100 WBC (ref 0–0.2)
PH UR STRIP.AUTO: 7.5 [PH] (ref 5–8)
PLATELET # BLD AUTO: 180 10*3/MM3 (ref 140–450)
PMV BLD AUTO: 10.6 FL (ref 6–12)
POTASSIUM SERPL-SCNC: 3.7 MMOL/L (ref 3.5–5.2)
PROT SERPL-MCNC: 5.9 G/DL (ref 6–8.5)
PROT UR QL STRIP: NEGATIVE
RBC # BLD AUTO: 3.96 10*6/MM3 (ref 3.77–5.28)
RBC # UR STRIP: ABNORMAL /HPF
REF LAB TEST METHOD: ABNORMAL
SODIUM SERPL-SCNC: 145 MMOL/L (ref 136–145)
SP GR UR STRIP: 1.01 (ref 1–1.03)
SQUAMOUS #/AREA URNS HPF: ABNORMAL /HPF
UROBILINOGEN UR QL STRIP: ABNORMAL
WBC # UR STRIP: ABNORMAL /HPF
WBC NRBC COR # BLD: 11.35 10*3/MM3 (ref 3.4–10.8)

## 2023-06-12 PROCEDURE — 85025 COMPLETE CBC W/AUTO DIFF WBC: CPT | Performed by: EMERGENCY MEDICINE

## 2023-06-12 PROCEDURE — 71260 CT THORAX DX C+: CPT

## 2023-06-12 PROCEDURE — 70450 CT HEAD/BRAIN W/O DYE: CPT

## 2023-06-12 PROCEDURE — 80053 COMPREHEN METABOLIC PANEL: CPT | Performed by: EMERGENCY MEDICINE

## 2023-06-12 PROCEDURE — 25510000001 IOPAMIDOL 61 % SOLUTION: Performed by: EMERGENCY MEDICINE

## 2023-06-12 PROCEDURE — 99284 EMERGENCY DEPT VISIT MOD MDM: CPT

## 2023-06-12 PROCEDURE — 81001 URINALYSIS AUTO W/SCOPE: CPT | Performed by: EMERGENCY MEDICINE

## 2023-06-12 PROCEDURE — 96360 HYDRATION IV INFUSION INIT: CPT

## 2023-06-12 PROCEDURE — 72125 CT NECK SPINE W/O DYE: CPT

## 2023-06-12 PROCEDURE — 76376 3D RENDER W/INTRP POSTPROCES: CPT

## 2023-06-12 PROCEDURE — 71045 X-RAY EXAM CHEST 1 VIEW: CPT

## 2023-06-12 PROCEDURE — 96361 HYDRATE IV INFUSION ADD-ON: CPT

## 2023-06-12 PROCEDURE — 83690 ASSAY OF LIPASE: CPT | Performed by: EMERGENCY MEDICINE

## 2023-06-12 PROCEDURE — 74177 CT ABD & PELVIS W/CONTRAST: CPT

## 2023-06-12 RX ORDER — SODIUM CHLORIDE 9 MG/ML
125 INJECTION, SOLUTION INTRAVENOUS CONTINUOUS
Status: DISCONTINUED | OUTPATIENT
Start: 2023-06-12 | End: 2023-06-13 | Stop reason: HOSPADM

## 2023-06-12 RX ORDER — SODIUM CHLORIDE 0.9 % (FLUSH) 0.9 %
10 SYRINGE (ML) INJECTION AS NEEDED
Status: DISCONTINUED | OUTPATIENT
Start: 2023-06-12 | End: 2023-06-13 | Stop reason: HOSPADM

## 2023-06-12 RX ADMIN — IOPAMIDOL 85 ML: 612 INJECTION, SOLUTION INTRAVENOUS at 22:43

## 2023-06-12 RX ADMIN — SODIUM CHLORIDE 125 ML/HR: 9 INJECTION, SOLUTION INTRAVENOUS at 21:09
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
## 2023-06-13 VITALS
TEMPERATURE: 97.7 F | SYSTOLIC BLOOD PRESSURE: 135 MMHG | WEIGHT: 150 LBS | BODY MASS INDEX: 28.32 KG/M2 | DIASTOLIC BLOOD PRESSURE: 72 MMHG | OXYGEN SATURATION: 96 % | RESPIRATION RATE: 16 BRPM | HEIGHT: 61 IN | HEART RATE: 89 BPM

## 2023-06-13 PROCEDURE — 96361 HYDRATE IV INFUSION ADD-ON: CPT

## 2023-06-13 RX ORDER — LIDOCAINE 50 MG/G
1 PATCH TOPICAL DAILY PRN
Qty: 6 EACH | Refills: 0 | Status: SHIPPED | OUTPATIENT
Start: 2023-06-13

## 2023-06-13 RX ORDER — LIDOCAINE 50 MG/G
1 PATCH TOPICAL ONCE
Status: DISCONTINUED | OUTPATIENT
Start: 2023-06-13 | End: 2023-06-13 | Stop reason: HOSPADM

## 2023-06-13 RX ADMIN — LIDOCAINE 1 PATCH: 50 PATCH CUTANEOUS at 00:56

## 2023-06-13 NOTE — DISCHARGE INSTRUCTIONS
You are advised to follow closely with Dr. Mata in 2-3 days for recheck, follow-up of elevated lipase, CT abnormalities of your pancreas, final results of lab work and imaging testing, and further testing/treatment as needed.    You have an abnormality seen of your pancreas on CT imaging today that the radiologist is recommending further imaging/testing for due to abnormal growth of cells such as cancer which could be life-threatening or disabling.    Please return to the emergency department immediately with chest pain different than usual for you, shortness of air, persistent or worsening abdominal pain, persistent vomiting/fever, blood in emesis or stool, lightheadedness/fainting, problems with speech, one sided weakness/numbness, new incontinence, problems with vision, altered mental status, difficulty waking or for worsening of symptoms or other concerns.
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
## 2023-07-30 ENCOUNTER — APPOINTMENT (OUTPATIENT)
Dept: CT IMAGING | Facility: HOSPITAL | Age: 78
End: 2023-07-30
Payer: MEDICARE

## 2023-07-30 ENCOUNTER — APPOINTMENT (OUTPATIENT)
Dept: GENERAL RADIOLOGY | Facility: HOSPITAL | Age: 78
End: 2023-07-30
Payer: MEDICARE

## 2023-07-30 ENCOUNTER — HOSPITAL ENCOUNTER (OUTPATIENT)
Facility: HOSPITAL | Age: 78
Setting detail: OBSERVATION
LOS: 1 days | Discharge: NURSING, REHAB OR LONG-TERM CARE | End: 2023-08-03
Attending: EMERGENCY MEDICINE | Admitting: HOSPITALIST
Payer: MEDICARE
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
## 2023-07-30 DIAGNOSIS — S32.9XXA CLOSED DISPLACED FRACTURE OF PELVIS, UNSPECIFIED PART OF PELVIS, INITIAL ENCOUNTER: Primary | ICD-10-CM

## 2023-07-30 LAB
ALBUMIN SERPL-MCNC: 3.9 G/DL (ref 3.5–5.2)
ALBUMIN/GLOB SERPL: 2.2 G/DL
ALP SERPL-CCNC: 38 U/L (ref 39–117)
ALT SERPL W P-5'-P-CCNC: 27 U/L (ref 1–33)
ANION GAP SERPL CALCULATED.3IONS-SCNC: 12 MMOL/L (ref 5–15)
APTT PPP: 26.1 SECONDS (ref 22.7–35.4)
AST SERPL-CCNC: 25 U/L (ref 1–32)
BASOPHILS # BLD AUTO: 0.02 10*3/MM3 (ref 0–0.2)
BASOPHILS NFR BLD AUTO: 0.2 % (ref 0–1.5)
BILIRUB SERPL-MCNC: 0.3 MG/DL (ref 0–1.2)
BUN SERPL-MCNC: 13 MG/DL (ref 8–23)
BUN/CREAT SERPL: 10.1 (ref 7–25)
CALCIUM SPEC-SCNC: 9.8 MG/DL (ref 8.6–10.5)
CHLORIDE SERPL-SCNC: 108 MMOL/L (ref 98–107)
CO2 SERPL-SCNC: 26 MMOL/L (ref 22–29)
CREAT SERPL-MCNC: 1.29 MG/DL (ref 0.57–1)
DEPRECATED RDW RBC AUTO: 41.9 FL (ref 37–54)
EGFRCR SERPLBLD CKD-EPI 2021: 43.1 ML/MIN/1.73
EOSINOPHIL # BLD AUTO: 0.19 10*3/MM3 (ref 0–0.4)
EOSINOPHIL NFR BLD AUTO: 2.3 % (ref 0.3–6.2)
ERYTHROCYTE [DISTWIDTH] IN BLOOD BY AUTOMATED COUNT: 12.4 % (ref 12.3–15.4)
GLOBULIN UR ELPH-MCNC: 1.8 GM/DL
GLUCOSE BLDC GLUCOMTR-MCNC: 199 MG/DL (ref 70–130)
GLUCOSE SERPL-MCNC: 183 MG/DL (ref 65–99)
HCT VFR BLD AUTO: 37.3 % (ref 34–46.6)
HGB BLD-MCNC: 12.2 G/DL (ref 12–15.9)
IMM GRANULOCYTES # BLD AUTO: 0.15 10*3/MM3 (ref 0–0.05)
IMM GRANULOCYTES NFR BLD AUTO: 1.8 % (ref 0–0.5)
INR PPP: 1.08 (ref 0.9–1.1)
LYMPHOCYTES # BLD AUTO: 1.46 10*3/MM3 (ref 0.7–3.1)
LYMPHOCYTES NFR BLD AUTO: 18 % (ref 19.6–45.3)
MCH RBC QN AUTO: 30.3 PG (ref 26.6–33)
MCHC RBC AUTO-ENTMCNC: 32.7 G/DL (ref 31.5–35.7)
MCV RBC AUTO: 92.8 FL (ref 79–97)
MONOCYTES # BLD AUTO: 0.43 10*3/MM3 (ref 0.1–0.9)
MONOCYTES NFR BLD AUTO: 5.3 % (ref 5–12)
NEUTROPHILS NFR BLD AUTO: 5.88 10*3/MM3 (ref 1.7–7)
NEUTROPHILS NFR BLD AUTO: 72.4 % (ref 42.7–76)
NRBC BLD AUTO-RTO: 0 /100 WBC (ref 0–0.2)
PLATELET # BLD AUTO: 166 10*3/MM3 (ref 140–450)
PMV BLD AUTO: 9.9 FL (ref 6–12)
POTASSIUM SERPL-SCNC: 4.2 MMOL/L (ref 3.5–5.2)
PROT SERPL-MCNC: 5.7 G/DL (ref 6–8.5)
PROTHROMBIN TIME: 14.1 SECONDS (ref 11.7–14.2)
QT INTERVAL: 374 MS
RBC # BLD AUTO: 4.02 10*6/MM3 (ref 3.77–5.28)
SODIUM SERPL-SCNC: 146 MMOL/L (ref 136–145)
WBC NRBC COR # BLD: 8.13 10*3/MM3 (ref 3.4–10.8)

## 2023-07-30 PROCEDURE — 73502 X-RAY EXAM HIP UNI 2-3 VIEWS: CPT

## 2023-07-30 PROCEDURE — 85730 THROMBOPLASTIN TIME PARTIAL: CPT | Performed by: EMERGENCY MEDICINE

## 2023-07-30 PROCEDURE — 96361 HYDRATE IV INFUSION ADD-ON: CPT

## 2023-07-30 PROCEDURE — 80053 COMPREHEN METABOLIC PANEL: CPT | Performed by: EMERGENCY MEDICINE

## 2023-07-30 PROCEDURE — 99284 EMERGENCY DEPT VISIT MOD MDM: CPT

## 2023-07-30 PROCEDURE — 25010000002 HYDROMORPHONE PER 4 MG: Performed by: EMERGENCY MEDICINE

## 2023-07-30 PROCEDURE — 96374 THER/PROPH/DIAG INJ IV PUSH: CPT

## 2023-07-30 PROCEDURE — 82948 REAGENT STRIP/BLOOD GLUCOSE: CPT

## 2023-07-30 PROCEDURE — 63710000001 INSULIN LISPRO (HUMAN) PER 5 UNITS: Performed by: INTERNAL MEDICINE

## 2023-07-30 PROCEDURE — 71045 X-RAY EXAM CHEST 1 VIEW: CPT

## 2023-07-30 PROCEDURE — 93005 ELECTROCARDIOGRAM TRACING: CPT | Performed by: EMERGENCY MEDICINE

## 2023-07-30 PROCEDURE — 85025 COMPLETE CBC W/AUTO DIFF WBC: CPT | Performed by: EMERGENCY MEDICINE

## 2023-07-30 PROCEDURE — 93010 ELECTROCARDIOGRAM REPORT: CPT | Performed by: STUDENT IN AN ORGANIZED HEALTH CARE EDUCATION/TRAINING PROGRAM

## 2023-07-30 PROCEDURE — 72192 CT PELVIS W/O DYE: CPT

## 2023-07-30 PROCEDURE — 85610 PROTHROMBIN TIME: CPT | Performed by: EMERGENCY MEDICINE

## 2023-07-30 RX ORDER — ATORVASTATIN CALCIUM 20 MG/1
20 TABLET, FILM COATED ORAL DAILY
Status: DISCONTINUED | OUTPATIENT
Start: 2023-07-31 | End: 2023-08-01

## 2023-07-30 RX ORDER — HYDROMORPHONE HYDROCHLORIDE 1 MG/ML
0.5 INJECTION, SOLUTION INTRAMUSCULAR; INTRAVENOUS; SUBCUTANEOUS ONCE
Status: COMPLETED | OUTPATIENT
Start: 2023-07-30 | End: 2023-07-30

## 2023-07-30 RX ORDER — HYDROCODONE BITARTRATE AND ACETAMINOPHEN 5; 325 MG/1; MG/1
1 TABLET ORAL EVERY 4 HOURS PRN
Status: DISCONTINUED | OUTPATIENT
Start: 2023-07-30 | End: 2023-08-03 | Stop reason: HOSPADM

## 2023-07-30 RX ORDER — IBUPROFEN 600 MG/1
1 TABLET ORAL
Status: DISCONTINUED | OUTPATIENT
Start: 2023-07-30 | End: 2023-08-01

## 2023-07-30 RX ORDER — ONDANSETRON 2 MG/ML
4 INJECTION INTRAMUSCULAR; INTRAVENOUS EVERY 6 HOURS PRN
Status: DISCONTINUED | OUTPATIENT
Start: 2023-07-30 | End: 2023-08-03

## 2023-07-30 RX ORDER — ONDANSETRON 4 MG/1
4 TABLET, FILM COATED ORAL EVERY 6 HOURS PRN
Status: DISCONTINUED | OUTPATIENT
Start: 2023-07-30 | End: 2023-08-01

## 2023-07-30 RX ORDER — ACETAMINOPHEN 325 MG/1
650 TABLET ORAL EVERY 4 HOURS PRN
Status: DISCONTINUED | OUTPATIENT
Start: 2023-07-30 | End: 2023-08-03

## 2023-07-30 RX ORDER — POLYETHYLENE GLYCOL 3350 17 G/17G
17 POWDER, FOR SOLUTION ORAL DAILY PRN
Status: DISCONTINUED | OUTPATIENT
Start: 2023-07-30 | End: 2023-08-01

## 2023-07-30 RX ORDER — UREA 10 %
3 LOTION (ML) TOPICAL NIGHTLY PRN
Status: DISCONTINUED | OUTPATIENT
Start: 2023-07-30 | End: 2023-08-03

## 2023-07-30 RX ORDER — INSULIN LISPRO 100 [IU]/ML
2-9 INJECTION, SOLUTION INTRAVENOUS; SUBCUTANEOUS
Status: DISCONTINUED | OUTPATIENT
Start: 2023-07-30 | End: 2023-08-03 | Stop reason: HOSPADM

## 2023-07-30 RX ORDER — BISACODYL 5 MG/1
5 TABLET, DELAYED RELEASE ORAL DAILY PRN
Status: DISCONTINUED | OUTPATIENT
Start: 2023-07-30 | End: 2023-08-01

## 2023-07-30 RX ORDER — HYDROMORPHONE HYDROCHLORIDE 1 MG/ML
0.5 INJECTION, SOLUTION INTRAMUSCULAR; INTRAVENOUS; SUBCUTANEOUS
Status: DISCONTINUED | OUTPATIENT
Start: 2023-07-30 | End: 2023-08-01

## 2023-07-30 RX ORDER — GABAPENTIN 400 MG/1
800 CAPSULE ORAL EVERY 8 HOURS SCHEDULED
Status: DISCONTINUED | OUTPATIENT
Start: 2023-07-30 | End: 2023-08-03 | Stop reason: HOSPADM

## 2023-07-30 RX ORDER — DEXTROSE MONOHYDRATE 25 G/50ML
25 INJECTION, SOLUTION INTRAVENOUS
Status: DISCONTINUED | OUTPATIENT
Start: 2023-07-30 | End: 2023-08-01

## 2023-07-30 RX ORDER — LOSARTAN POTASSIUM 100 MG/1
100 TABLET ORAL
Status: DISCONTINUED | OUTPATIENT
Start: 2023-07-31 | End: 2023-08-03 | Stop reason: HOSPADM

## 2023-07-30 RX ORDER — AMOXICILLIN 250 MG
2 CAPSULE ORAL 2 TIMES DAILY
Status: DISCONTINUED | OUTPATIENT
Start: 2023-07-30 | End: 2023-08-03 | Stop reason: HOSPADM

## 2023-07-30 RX ORDER — BISACODYL 10 MG
10 SUPPOSITORY, RECTAL RECTAL DAILY PRN
Status: DISCONTINUED | OUTPATIENT
Start: 2023-07-30 | End: 2023-08-01

## 2023-07-30 RX ORDER — NALOXONE HCL 0.4 MG/ML
0.4 VIAL (ML) INJECTION
Status: DISCONTINUED | OUTPATIENT
Start: 2023-07-30 | End: 2023-08-01

## 2023-07-30 RX ORDER — DULOXETIN HYDROCHLORIDE 30 MG/1
30 CAPSULE, DELAYED RELEASE ORAL DAILY
Status: DISCONTINUED | OUTPATIENT
Start: 2023-07-31 | End: 2023-08-03 | Stop reason: HOSPADM

## 2023-07-30 RX ORDER — NICOTINE POLACRILEX 4 MG
15 LOZENGE BUCCAL
Status: DISCONTINUED | OUTPATIENT
Start: 2023-07-30 | End: 2023-08-01

## 2023-07-30 RX ORDER — SODIUM CHLORIDE 0.9 % (FLUSH) 0.9 %
10 SYRINGE (ML) INJECTION AS NEEDED
Status: DISCONTINUED | OUTPATIENT
Start: 2023-07-30 | End: 2023-08-03 | Stop reason: HOSPADM

## 2023-07-30 RX ORDER — SODIUM CHLORIDE 9 MG/ML
75 INJECTION, SOLUTION INTRAVENOUS CONTINUOUS
Status: DISCONTINUED | OUTPATIENT
Start: 2023-07-30 | End: 2023-07-31

## 2023-07-30 RX ADMIN — HYDROCODONE BITARTRATE AND ACETAMINOPHEN 1 TABLET: 5; 325 TABLET ORAL at 21:45

## 2023-07-30 RX ADMIN — SENNOSIDES AND DOCUSATE SODIUM 2 TABLET: 50; 8.6 TABLET ORAL at 21:21

## 2023-07-30 RX ADMIN — GABAPENTIN 800 MG: 400 CAPSULE ORAL at 21:21

## 2023-07-30 RX ADMIN — HYDROMORPHONE HYDROCHLORIDE 0.5 MG: 1 INJECTION, SOLUTION INTRAMUSCULAR; INTRAVENOUS; SUBCUTANEOUS at 13:51

## 2023-07-30 RX ADMIN — SODIUM CHLORIDE 75 ML/HR: 9 INJECTION, SOLUTION INTRAVENOUS at 21:22

## 2023-07-30 RX ADMIN — INSULIN LISPRO 2 UNITS: 100 INJECTION, SOLUTION INTRAVENOUS; SUBCUTANEOUS at 21:21
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
## 2023-07-30 NOTE — PLAN OF CARE
Goal Outcome Evaluation:         Patient arrived from ED VSS and alert and oriented. Patient had a bowel movement and urinated and arrived soiled. Patient was cleaned up and no skin breakdown noted but excoriation present near rectum.

## 2023-07-30 NOTE — ED PROVIDER NOTES
EMERGENCY DEPARTMENT ENCOUNTER    Room Number:  17/17  Date of encounter:  7/30/2023  PCP: Moustapha Mata MD  Historian: Patient    Patient was placed in face mask during triage process. Patient was wearing facemask when I entered the room and throughout our encounter. I wore full protective equipment throughout this patient encounter including a face mask, eye protection, and gloves. Hand hygiene was performed before donning protective equipment and again following doffing of PPE after leaving the room.    HPI:  Chief Complaint: Hip pain status post fall  A complete HPI/ROS/PMH/PSH/SH/FH are unobtainable due to: N/A   Context: Kristina Johnson is a 78 y.o. female who presents to the ED c/o acute onset left hip pain status post fall.  The patient reports that she was in her kitchen and turned around to answer the phone quickly when she misstepped and fell landing directly on her left hip.  She denies any other injuries such as ankle, arm, head or neck.  She has no shortness of breath, chest pain, abdominal pain, nausea or vomiting and denies any syncope.      MEDICAL HISTORY REVIEW  EMR reviewed:    PCP office note 6/22/2023 by Dr. Mata reviewed: Patient seen in ER follow-up 2 schedule further evaluation of lesion of the pancreas noted on CT here in the ED in June.    PAST MEDICAL HISTORY  Active Ambulatory Problems     Diagnosis Date Noted    No Active Ambulatory Problems     Resolved Ambulatory Problems     Diagnosis Date Noted    No Resolved Ambulatory Problems     Past Medical History:   Diagnosis Date    Diabetes mellitus          PAST SURGICAL HISTORY  History reviewed. No pertinent surgical history.      FAMILY HISTORY  History reviewed. No pertinent family history.      SOCIAL HISTORY  Social History     Socioeconomic History    Marital status: Single         ALLERGIES  Codeine and Ace inhibitors        REVIEW OF SYSTEMS  Review of Systems     All systems reviewed and negative except for those  discussed in HPI.       PHYSICAL EXAM    I have reviewed the triage vital signs and nursing notes.    ED Triage Vitals [07/30/23 1320]   Temp Heart Rate Resp BP SpO2   98 øF (36.7 øC) 88 16 120/58 94 %      Temp src Heart Rate Source Patient Position BP Location FiO2 (%)   -- Monitor -- -- --       Physical Exam    Physical Exam   Constitutional: No distress.  Nontoxic  HENT:  Head: Normocephalic and atraumatic.   Oropharynx: Mucous membranes are moist.   Eyes: No scleral icterus. No conjunctival pallor.  Neck: Painless range of motion noted. Neck supple.  No midline tenderness to palpation or step-off  Cardiovascular: Normal rate, regular rhythm and intact distal pulses.  Pulmonary/Chest: No respiratory distress.  No tachypnea or increased work of breathing.    Musculoskeletal: Atraumatic right lower extremity and bilateral upper extremities.  Tenderness with palpation about the left hip with no shortening or lateral rotation noted.  There is significant discomfort with internal/external rotation of the hip.  Neurological: Alert and appropriate with fluent easily intelligible speech.   Skin: Skin is pink, warm, and dry. No pallor.   Psychiatric: Mood and affect normal.   Nursing note and vitals reviewed.    LAB RESULTS  Recent Results (from the past 24 hour(s))   Comprehensive Metabolic Panel    Collection Time: 07/30/23  1:49 PM    Specimen: Blood   Result Value Ref Range    Glucose 183 (H) 65 - 99 mg/dL    BUN 13 8 - 23 mg/dL    Creatinine 1.29 (H) 0.57 - 1.00 mg/dL    Sodium 146 (H) 136 - 145 mmol/L    Potassium 4.2 3.5 - 5.2 mmol/L    Chloride 108 (H) 98 - 107 mmol/L    CO2 26.0 22.0 - 29.0 mmol/L    Calcium 9.8 8.6 - 10.5 mg/dL    Total Protein 5.7 (L) 6.0 - 8.5 g/dL    Albumin 3.9 3.5 - 5.2 g/dL    ALT (SGPT) 27 1 - 33 U/L    AST (SGOT) 25 1 - 32 U/L    Alkaline Phosphatase 38 (L) 39 - 117 U/L    Total Bilirubin 0.3 0.0 - 1.2 mg/dL    Globulin 1.8 gm/dL    A/G Ratio 2.2 g/dL    BUN/Creatinine Ratio 10.1 7.0 -  25.0    Anion Gap 12.0 5.0 - 15.0 mmol/L    eGFR 43.1 (L) >60.0 mL/min/1.73   Protime-INR    Collection Time: 07/30/23  1:49 PM    Specimen: Blood   Result Value Ref Range    Protime 14.1 11.7 - 14.2 Seconds    INR 1.08 0.90 - 1.10   aPTT    Collection Time: 07/30/23  1:49 PM    Specimen: Blood   Result Value Ref Range    PTT 26.1 22.7 - 35.4 seconds   CBC Auto Differential    Collection Time: 07/30/23  1:49 PM    Specimen: Blood   Result Value Ref Range    WBC 8.13 3.40 - 10.80 10*3/mm3    RBC 4.02 3.77 - 5.28 10*6/mm3    Hemoglobin 12.2 12.0 - 15.9 g/dL    Hematocrit 37.3 34.0 - 46.6 %    MCV 92.8 79.0 - 97.0 fL    MCH 30.3 26.6 - 33.0 pg    MCHC 32.7 31.5 - 35.7 g/dL    RDW 12.4 12.3 - 15.4 %    RDW-SD 41.9 37.0 - 54.0 fl    MPV 9.9 6.0 - 12.0 fL    Platelets 166 140 - 450 10*3/mm3    Neutrophil % 72.4 42.7 - 76.0 %    Lymphocyte % 18.0 (L) 19.6 - 45.3 %    Monocyte % 5.3 5.0 - 12.0 %    Eosinophil % 2.3 0.3 - 6.2 %    Basophil % 0.2 0.0 - 1.5 %    Immature Grans % 1.8 (H) 0.0 - 0.5 %    Neutrophils, Absolute 5.88 1.70 - 7.00 10*3/mm3    Lymphocytes, Absolute 1.46 0.70 - 3.10 10*3/mm3    Monocytes, Absolute 0.43 0.10 - 0.90 10*3/mm3    Eosinophils, Absolute 0.19 0.00 - 0.40 10*3/mm3    Basophils, Absolute 0.02 0.00 - 0.20 10*3/mm3    Immature Grans, Absolute 0.15 (H) 0.00 - 0.05 10*3/mm3    nRBC 0.0 0.0 - 0.2 /100 WBC   ECG 12 Lead Pre-Op / Pre-Procedure    Collection Time: 07/30/23  2:13 PM   Result Value Ref Range    QT Interval 356 ms       Ordered the above labs and independently reviewed the results.        RADIOLOGY  XR Hip With or Without Pelvis 2 - 3 View Left, XR Chest 1 View    Result Date: 7/30/2023  CHEST, PELVIS/LEFT HIP  HISTORY: Fall this morning with left hip injury.  COMPARISON: CT chest 06/12/2023.  AP CHEST: The heart size is within normal limits. There is elevation of the right hemidiaphragm. Lungs appear clear of focal airspace disease and there is no evidence for pulmonary edema or pleural  effusion or infiltrate. There is evidence for cervical spine instrumentation. There appears to be a healing right lateral 4th rib fracture. Cholecystectomy clips are present.  AP PELVIS AND AP AND FROG LATERAL LEFT HIP: There are acute fractures of the left superior and inferior pubic rami and left pubic body with impaction. No additional fracture is demonstrated.      1. Acute fractures of the left pubic body and left superior and inferior pubic rami. 2. No evidence for active disease in the chest. There appears to be a healing fracture of the right lateral 4th rib.  This report was finalized on 7/30/2023 3:55 PM by Dr. Maycol Du M.D.       I ordered the above noted radiological studies. Reviewed by me and discussed with radiologist.  See dictation for official radiology interpretation.      PROCEDURES    Procedures        MEDICATIONS GIVEN IN ER    Medications   sodium chloride 0.9 % flush 10 mL (has no administration in time range)   HYDROmorphone (DILAUDID) injection 0.5 mg (0.5 mg Intravenous Given 7/30/23 1351)         PROGRESS, DATA ANALYSIS, CONSULTS, AND MEDICAL DECISION MAKING    My diagnosis for lower extremity pain and injury includes but is not limited to hip fracture, femur fracture, hip dislocation, hip contusion, hip sprain, hip strain, pelvic fracture, ischio-tibial band pain, ischio-tibial band bursitis, knee sprain, patella dislocation, knee dislocation, internal derangement of knee, fractures of the femur, tibia, fibula, ankle, foot and digits, ankle sprain, ankle dislocation, Lisfranc fracture, fracture dislocations of the digits, pulmonary embolism, claudication, peripheral vascular disease, gout, osteoarthritis, rheumatoid arthritis, bursitis, septic joint, poly-rheumatica, polyarthralgia and other inflammatory or infectious disease processes.      All labs have been independently reviewed by me.  All radiology studies have been reviewed by me and discussed with radiologist dictating  the report.   EKG's independently viewed and interpreted by me.  Discussion below represents my analysis of pertinent findings related to patient's condition, differential diagnosis, treatment plan and final disposition.      ED Course as of 07/30/23 1609   Sun Jul 30, 2023   1435 RADIOLOGY      Study: Pelvis and left hip  Findings: Left-sided inferior rami fracture  I independently viewed and interpreted these images contemporaneously with treatment.    [RS]   1501 EKG           EKG time: 1453  Rhythm/Rate: Sinus, 80  P waves and SC: ANTOLIN within normal limits  QRS, axis: Narrow complex  ST and T waves: No STEMI; QTc within normal limits    Interpreted Contemporaneously by me, independently viewed  Comparison: 6/11/2023-no acute change   [RS]   1502 Patient updated with abnormal findings and need for further imaging and orthopedic consultation with likely admission.  Pain better controlled at this time. [RS]   1536 Orthopedics called back but I could not understand him as we had a bad connection.  He relayed that he would call back. [RS]   1540 CONSULT        Provider: Dr. Easton-orthopedics    Discussion: Reviewed patient history, ED presentation and evaluation as well as current imaging and pending CT result.  He feels comfortable being able to manage that at this facility and recommends the patient come in through the hospitalist service for admission.    Agreeable c treatment and planned disposition.         [RS]   1555 CONSULT        Provider: Dr. Brody - Ortho    Discussion: Reviewed patient history, ED presentation and findings as well as plan for hospitalist admission.    Agreeable c treatment and planned disposition.         [RS]   1607 CONSULT        Provider: [facility]    Discussion: Reviewed patient history, ED presentation and evaluation as well as consultation with orthopedics.  Agreeable to accept the patient for admission on behalf of Dr. Escobar.    Agreeable c treatment and planned  disposition.         [RS]      ED Course User Index  [RS] Aubrey Simpson MD       AS OF 16:09 EDT VITALS:    BP - 119/64  HR - 86  TEMP - 98 øF (36.7 øC)  O2 SATS - 94%        DIAGNOSIS  Final diagnoses:   Closed displaced fracture of pelvis, unspecified part of pelvis, initial encounter   Fall in home, initial encounter         DISPOSITION  ADMISSION    Discussed treatment plan and reason for admission with pt/family and admitting physician.  Pt/family voiced understanding of the plan for admission for further testing/treatment as needed.          Aubrey Simpson MD  07/30/23 6179

## 2023-07-30 NOTE — CONSULTS
Orthopedic Consult    Patient: Kristina Johnson                                           YOB: 1945     Date of Admission: 7/30/2023  1:22 PM            Medical Record Number: [id number]    Attending Physician: Aubrey Simpson MD    Consulting Physician: Tesfaye Brody MD    Reason for Consult:  Pelvic  Fracture.   1. Pubic Ramus  LEFT Superior and Inferior.       History of Present Illness: 78 y.o. female admitted to [facility] with No admission diagnoses are documented for this encounter..    The patient was evaluated in the emergency room and was diagnosed with a pelvic fracture.   Secondary to the age and multiple medical comorbidities the patient was admitted to the hospitalist.   As I was on call for the emergency room I was consulted for further evaluation and treatment.   The patient was in the usual state of health and fell from standing height in her home, Resulting in sudden onset left groin pain and inability to ambulate.   Denies any history of loss of consciousness, headache, vomiting, or seizures.   Denies any other injuries.   The patient is accompanied by  family members  to this hospital visit.   The patient denies any prior  pre-existing pain in the hip.   The patient denies walker as assistive device prior to this fall.   The patient  lives at home alone , is independent in activities of daily living.  The patient denies history of dementia.    Past medical history, past surgical history, social history, family history, ALLERGIES, current medications have been reviewed by me.    Past Medical History:   Diagnosis Date    Diabetes mellitus      History reviewed. No pertinent surgical history.  Social History     Occupational History    Not on file   Tobacco Use    Smoking status: Not on file    Smokeless tobacco: Not on file   Substance and Sexual Activity    Alcohol use: Not on file    Drug use: Not on file    Sexual activity: Not on file      Social History      Social History Narrative    Not on file     History reviewed. No pertinent family history.     Allergies   Allergen Reactions    Codeine GI Intolerance    Ace Inhibitors Other (See Comments)     Cough       Home Medications:  (Not in a hospital admission)      Current Medications:  Scheduled Meds:   Continuous Infusions:   PRN Meds:.  [COMPLETED] Insert Peripheral IV **AND** sodium chloride    Review of Systems:   A 12 point system review was reviewed with the patient and from the chart  and is negative except as in history of present illness.      Physical Exam: 78 y.o. female                    Vitals:    07/30/23 1414 07/30/23 1415 07/30/23 1501 07/30/23 1531   BP:   133/69 119/64   Pulse: 88 90 84 86   Resp:       Temp:       SpO2: 96% 95% 94% 94%   Weight:       Height:            Gait: Could not be tested , patient is nonambulatory.    Mental/HEENT/cardio/skin: The patient's general appearance was well-nourished, well-hydrated, no acute distress.  Orientation was alert and oriented x3.  The patient's mood was normal.   Pulmonary exam shows normal late exchange, no labored breathing, or shortness of breath.    The skin exam showed normal temperature and color in the area of examination.    Extremities: Lower extremities no  shortening, no obvious deformity.   Compression of pelvis reproduced pain in the groin.   Attempted active movements of the  left hip are painful and restricted. I am able to gently logroll the hip without significant pain.   The patient is able to do gentle active range of motion of her ankle and toes.   Gross sensation is intact over the toes.    Pulses: Pulses palpable and equal bilaterally    Diagnostic Tests:    Results from last 7 days   Lab Units 07/30/23  1349   WBC 10*3/mm3 8.13   HEMOGLOBIN g/dL 12.2   HEMATOCRIT % 37.3   PLATELETS 10*3/mm3 166     Results from last 7 days   Lab Units 07/30/23  1349   SODIUM mmol/L 146*   POTASSIUM mmol/L 4.2   CHLORIDE mmol/L 108*   CO2 mmol/L  26.0   BUN mg/dL 13   CREATININE mg/dL 1.29*   GLUCOSE mg/dL 183*   CALCIUM mg/dL 9.8     Results from last 7 days   Lab Units 07/30/23  1349   INR  1.08   APTT seconds 26.1     Lab Results   Component Value Date    URICACID 7.1 01/09/2020     No results found for: CRYSTAL  Microbiology Results (last 10 days)       ** No results found for the last 240 hours. **          XR Chest 1 View    Result Date: 7/30/2023  1. Acute fractures of the left pubic body and left superior and inferior pubic rami. 2. No evidence for active disease in the chest. There appears to be a healing fracture of the right lateral 4th rib.      XR Hip With or Without Pelvis 2 - 3 View Left    Result Date: 7/30/2023  1. Acute fractures of the left pubic body and left superior and inferior pubic rami. 2. No evidence for active disease in the chest. There appears to be a healing fracture of the right lateral 4th rib.       Assessment:  Pelvic  Fracture.   1. Pubic Ramus LEFT Superior and Inferior.         * No active hospital problems. *      Plan:    Options and alternatives have been discussed in detail with the patient.     The patient has a stable pelvic injury.   The patient is indicated for nonoperative management.   Activity as tolerated.  Will request PT for evaluation and gait training. Weight bearing as tolerated with walker for transfers and short distance gait.   May benefit from a short stay at a rehab center.  Pain management and DVT prophylaxis per admitting service.   Please notify PCP for possible evaluation of Osteoporosis if not already done.   Follow up with me in office in 3-4 weeks. , call office [phone] for appointment.     Date: 7/30/2023    Tesfaye Brody MD    CC: Moustapha Mata MD; MD Calvin Faye Ryan J, MD

## 2023-07-30 NOTE — H&P
HISTORY AND PHYSICAL   [facility]        Date of Admission: 2023  Patient Identification:  Name: Kristina Johnson  Age: 78 y.o.  Sex: female  :  1945  MRN: [id number]                     Primary Care Physician: Moustapha Mata MD    Chief Complaint:  78 year old female who presented to the emergency room after a fall; she was trying to answer the phone and lost her balance; she denies loc; she lives alone    History of Present Illness:   As above    Past Medical History:  Past Medical History:   Diagnosis Date    Diabetes mellitus      Past Surgical History:  History reviewed. No pertinent surgical history.   Home Meds:  Medications Prior to Admission   Medication Sig Dispense Refill Last Dose    atorvastatin (LIPITOR) 20 MG tablet atorvastatin 20 mg tablet   2023    baclofen (LIORESAL) 10 MG tablet baclofen 10 mg tablet   Past Week    celecoxib (CeleBREX) 200 MG capsule celecoxib 200 mg capsule   2023    DULoxetine (CYMBALTA) 30 MG capsule duloxetine 30 mg capsule,delayed release   2023    Farxiga 10 MG tablet Farxiga 10 mg tablet   2023    gabapentin (NEURONTIN) 800 MG tablet 1 tablet 3 (Three) Times a Day.   2023    losartan (COZAAR) 100 MG tablet losartan 100 mg tablet   2023    metFORMIN (GLUCOPHAGE) 1000 MG tablet Take 1 tablet by mouth 2 (Two) Times a Day With Meals.   2023    metFORMIN ER (GLUCOPHAGE-XR) 500 MG 24 hr tablet metformin  mg tablet,extended release 24 hr   2023    raloxifene (EVISTA) 60 MG tablet raloxifene 60 mg tablet   2023    clonazePAM (KlonoPIN) 1 MG tablet clonazepam 1 mg tablet   Unknown    lidocaine (LIDODERM) 5 % Place 1 patch on the skin as directed by provider Daily As Needed for Mild Pain. Remove & Discard patch within 12 hours or as directed by MD 6 each 0        Allergies:  Allergies   Allergen Reactions    Codeine GI Intolerance    Ace Inhibitors Other (See Comments)     Cough  "    Immunizations:  Immunization History   Administered Date(s) Administered    COVID-19 (PFIZER) BIVALENT 12+YRS 10/10/2022    COVID-19 (PFIZER) Purple Cap Monovalent 2021, 2021, 10/05/2021     Social History:   Social History     Social History Narrative    Not on file     Social History     Socioeconomic History    Marital status: Single   Tobacco Use    Smoking status: Never    Smokeless tobacco: Never   Vaping Use    Vaping Use: Never used   Substance and Sexual Activity    Alcohol use: Never    Drug use: Never    Sexual activity: Defer       Family History:  History reviewed. No pertinent family history.     Review of Systems  See history of present illness and past medical history.  Patient denies headache, dizziness, syncope, falls, trauma, change in vision, change in hearing, change in taste, changes in weight, changes in appetite, focal weakness, numbness, or paresthesia.  Patient denies chest pain, palpitations, dyspnea, orthopnea, PND, cough, sinus pressure, rhinorrhea, epistaxis, hemoptysis, nausea, vomiting,hematemesis, diarrhea, constipation or hematochezia.  Denies cold or heat intolerance, polydipsia, polyuria, polyphagia. Denies hematuria, pyuria, dysuria, hesitancy, frequency or urgency. Denies consumption of raw and under cooked meats foods or change in water source.  Denies fever, chills, sweats, night sweats.  Denies missing any routine medications. Remainder of ROS is negative.    Objective:  T Max 24 hrs: Temp (24hrs), Av øF (36.7 øC), Min:98 øF (36.7 øC), Max:98 øF (36.7 øC)    Vitals Ranges:   Temp:  [98 øF (36.7 øC)] 98 øF (36.7 øC)  Heart Rate:  [84-91] 88  Resp:  [16] 16  BP: (104-133)/(43-69) 113/65      Exam:  /65   Pulse 88   Temp 98 øF (36.7 øC)   Resp 16   Ht 152.4 cm (60\")   Wt 68 kg (150 lb)   SpO2 95%   BMI 29.29 kg/mý     General Appearance:    Alert, cooperative, no distress, appears stated age   Head:    Normocephalic, without obvious abnormality, " atraumatic   Eyes:    PERRL, conjunctivae/corneas clear, EOM's intact, both eyes   Ears:    Normal external ear canals, both ears   Nose:   Nares normal, septum midline, mucosa normal, no drainage    or sinus tenderness   Throat:   Lips, mucosa, and tongue normal   Neck:   Supple, symmetrical, trachea midline, no adenopathy;     thyroid:  no enlargement/tenderness/nodules; no carotid    bruit or JVD   Back:     Symmetric, no curvature, ROM normal, no CVA tenderness   Lungs:     Decreased breath sounds bilaterally, respirations unlabored   Chest Wall:    No tenderness or deformity    Heart:    Regular rate and rhythm, S1 and S2 normal, no murmur, rub   or gallop   Abdomen:     Soft, nontender, bowel sounds active all four quadrants,     no masses, no hepatomegaly, no splenomegaly   Extremities:   Extremities normal, atraumatic, no cyanosis or edema   Pulses:   2+ and symmetric all extremities   Skin:   Skin color, texture, turgor normal, no rashes or lesions      .    Data Review:  Labs in chart were reviewed.  WBC   Date Value Ref Range Status   07/30/2023 8.13 3.40 - 10.80 10*3/mm3 Final     Hemoglobin   Date Value Ref Range Status   07/30/2023 12.2 12.0 - 15.9 g/dL Final     Hematocrit   Date Value Ref Range Status   07/30/2023 37.3 34.0 - 46.6 % Final     Platelets   Date Value Ref Range Status   07/30/2023 166 140 - 450 10*3/mm3 Final     Sodium   Date Value Ref Range Status   07/30/2023 146 (H) 136 - 145 mmol/L Final     Potassium   Date Value Ref Range Status   07/30/2023 4.2 3.5 - 5.2 mmol/L Final     Chloride   Date Value Ref Range Status   07/30/2023 108 (H) 98 - 107 mmol/L Final     CO2   Date Value Ref Range Status   07/30/2023 26.0 22.0 - 29.0 mmol/L Final     BUN   Date Value Ref Range Status   07/30/2023 13 8 - 23 mg/dL Final     Creatinine   Date Value Ref Range Status   07/30/2023 1.29 (H) 0.57 - 1.00 mg/dL Final     Glucose   Date Value Ref Range Status   07/30/2023 183 (H) 65 - 99 mg/dL Final      Calcium   Date Value Ref Range Status   07/30/2023 9.8 8.6 - 10.5 mg/dL Final                Imaging Results (All)       Procedure Component Value Units Date/Time    CT Pelvis Without Contrast [[id number]] Collected: 07/30/23 1609     Updated: 07/30/23 1613    Narrative:      CT PELVIS WITHOUT CONTRAST     HISTORY: Fall with pelvic fracture. Pain.     TECHNIQUE: CT includes axial images through the pelvis and data  reconstructed in coronal and sagittal planes.     FINDINGS: There are acute fractures of the left superior and inferior  pubic rami into the left pubic body with comminution and mild  displacement. There is surrounding soft tissue edema and intramuscular  and perimuscular hemorrhage. Extra peritoneal hemorrhage extends along  the left aspect of the urinary bladder and into the left hip adductor  musculature. Hemorrhage also extends within an anterior to the left  piriformis muscle and along the course of the left sciatic nerve.     There is also an acute fracture of the left sacral ala associated with  cortical buckling. Bones are somewhat osteopenic. There is no proximal  femoral or acetabular fracture.       Impression:      1. Comminuted fractures of the left pubic body and left superior and  inferior pubic rami with surrounding soft tissue edema/hemorrhage that  extends into the extra peritoneal space with mild mass effect on the  left aspect of the urinary bladder. Hemorrhage also extends within and  anterior to left piriformis muscle along the course of the left sciatic  nerve.  2. Acute fracture of the left sacral ala.     Radiation dose reduction techniques were utilized, including automated  exposure control and exposure modulation based on body size.     This report was finalized on 7/30/2023 4:10 PM by Dr. Maycol Du M.D.       XR Hip With or Without Pelvis 2 - 3 View Left [[id number]] Collected: 07/30/23 1528     Updated: 07/30/23 1558    Narrative:      CHEST, PELVIS/LEFT HIP      HISTORY: Fall this morning with left hip injury.     COMPARISON: CT chest 06/12/2023.     AP CHEST: The heart size is within normal limits. There is elevation of  the right hemidiaphragm. Lungs appear clear of focal airspace disease  and there is no evidence for pulmonary edema or pleural effusion or  infiltrate. There is evidence for cervical spine instrumentation. There  appears to be a healing right lateral 4th rib fracture. Cholecystectomy  clips are present.     AP PELVIS AND AP AND FROG LATERAL LEFT HIP: There are acute fractures of  the left superior and inferior pubic rami and left pubic body with  impaction. No additional fracture is demonstrated.       Impression:      1. Acute fractures of the left pubic body and left superior and inferior  pubic rami.  2. No evidence for active disease in the chest. There appears to be a  healing fracture of the right lateral 4th rib.     This report was finalized on 7/30/2023 3:55 PM by Dr. Maycol Du M.D.       XR Chest 1 View [[id number]] Collected: 07/30/23 1528     Updated: 07/30/23 1558    Narrative:      CHEST, PELVIS/LEFT HIP     HISTORY: Fall this morning with left hip injury.     COMPARISON: CT chest 06/12/2023.     AP CHEST: The heart size is within normal limits. There is elevation of  the right hemidiaphragm. Lungs appear clear of focal airspace disease  and there is no evidence for pulmonary edema or pleural effusion or  infiltrate. There is evidence for cervical spine instrumentation. There  appears to be a healing right lateral 4th rib fracture. Cholecystectomy  clips are present.     AP PELVIS AND AP AND FROG LATERAL LEFT HIP: There are acute fractures of  the left superior and inferior pubic rami and left pubic body with  impaction. No additional fracture is demonstrated.       Impression:      1. Acute fractures of the left pubic body and left superior and inferior  pubic rami.  2. No evidence for active disease in the chest. There appears to  be a  healing fracture of the right lateral 4th rib.     This report was finalized on 7/30/2023 3:55 PM by Dr. Maycol Du M.D.                 Assessment:  Active Hospital Problems    Diagnosis  POA    **Pelvis fracture [S32.9XXA]  Yes      Resolved Hospital Problems   No resolved problems to display.   Fall  Diabetes  Hyponatremia  ckd3    Plan:  Ortho to see  Pain control  Pt.ot to see  Accu checks, insulin sliding scale  Dw patient and ed provider    Silvia Mckeon MD  7/30/2023  17:30 EDT

## 2023-07-30 NOTE — ED NOTES
Nursing report ED to floor  Kristina Johnson  78 y.o.  female    HPI :   Chief Complaint   Patient presents with    Fall    Hip Pain       Admitting doctor:   Pedro Escobar MD    Admitting diagnosis:   The primary encounter diagnosis was Closed displaced fracture of pelvis, unspecified part of pelvis, initial encounter. A diagnosis of Fall in home, initial encounter was also pertinent to this visit.    Code status:   Current Code Status       Date Active Code Status Order ID Comments User Context       Not on file            Allergies:   Codeine and Ace inhibitors    Isolation:   No active isolations    Intake and Output  No intake or output data in the 24 hours ending 07/30/23 1618    Weight:       07/30/23  1320   Weight: 68 kg (150 lb)       Most recent vitals:   Vitals:    07/30/23 1415 07/30/23 1501 07/30/23 1531 07/30/23 1601   BP:  133/69 119/64 113/65   Pulse: 90 84 86 88   Resp:       Temp:       SpO2: 95% 94% 94% 95%   Weight:       Height:           Active LDAs/IV Access:   Lines, Drains & Airways       Active LDAs       Name Placement date Placement time Site Days    Peripheral IV 07/30/23 1351 Anterior;Proximal;Right Forearm 07/30/23  1351  Forearm  less than 1                    Labs (abnormal labs have a star):   Labs Reviewed   COMPREHENSIVE METABOLIC PANEL - Abnormal; Notable for the following components:       Result Value    Glucose 183 (*)     Creatinine 1.29 (*)     Sodium 146 (*)     Chloride 108 (*)     Total Protein 5.7 (*)     Alkaline Phosphatase 38 (*)     eGFR 43.1 (*)     All other components within normal limits    Narrative:     GFR Normal >60  Chronic Kidney Disease <60  Kidney Failure <15    The GFR formula is only valid for adults with stable renal function between ages 18 and 70.   CBC WITH AUTO DIFFERENTIAL - Abnormal; Notable for the following components:    Lymphocyte % 18.0 (*)     Immature Grans % 1.8 (*)     Immature Grans, Absolute 0.15 (*)     All other components within normal  limits   PROTIME-INR - Normal   APTT - Normal   CBC AND DIFFERENTIAL    Narrative:     The following orders were created for panel order CBC & Differential.  Procedure                               Abnormality         Status                     ---------                               -----------         ------                     CBC Auto Differential[[id number]]        Abnormal            Final result                 Please view results for these tests on the individual orders.       EKG:   ECG 12 Lead Pre-Op / Pre-Procedure   Preliminary Result   HEART RATE= 87  bpm   RR Interval= 690  ms   IL Interval= 148  ms   P Horizontal Axis= -21  deg   P Front Axis= 112  deg   QRSD Interval= 84  ms   QT Interval= 356  ms   QRS Axis= 109  deg   T Wave Axis= 177  deg   - ABNORMAL ECG -   Right and left arm electrode reversal, interpretation assumes no reversal   Sinus rhythm   Probable left atrial enlargement   Probable lateral infarct, age indeterminate   Electronically Signed By:    Date and Time of Study: 2023-07-30 14:13:41          Meds given in ED:   Medications   sodium chloride 0.9 % flush 10 mL (has no administration in time range)   HYDROmorphone (DILAUDID) injection 0.5 mg (0.5 mg Intravenous Given 7/30/23 1351)       Imaging results:  CT Pelvis Without Contrast    Result Date: 7/30/2023  1. Comminuted fractures of the left pubic body and left superior and inferior pubic rami with surrounding soft tissue edema/hemorrhage that extends into the extra peritoneal space with mild mass effect on the left aspect of the urinary bladder. Hemorrhage also extends within and anterior to left piriformis muscle along the course of the left sciatic nerve. 2. Acute fracture of the left sacral ala.  Radiation dose reduction techniques were utilized, including automated exposure control and exposure modulation based on body size.  This report was finalized on 7/30/2023 4:10 PM by Dr. Maycol Du M.D.      XR Chest 1  View    Result Date: 7/30/2023  1. Acute fractures of the left pubic body and left superior and inferior pubic rami. 2. No evidence for active disease in the chest. There appears to be a healing fracture of the right lateral 4th rib.  This report was finalized on 7/30/2023 3:55 PM by Dr. Maycol Du M.D.      XR Hip With or Without Pelvis 2 - 3 View Left    Result Date: 7/30/2023  1. Acute fractures of the left pubic body and left superior and inferior pubic rami. 2. No evidence for active disease in the chest. There appears to be a healing fracture of the right lateral 4th rib.  This report was finalized on 7/30/2023 3:55 PM by Dr. Maycol Du M.D.       Ambulatory status:   - Ax2    Social issues:   Social History     Socioeconomic History    Marital status: Single       NIH Stroke Scale:       Tara Bacon RN  07/30/23 16:18 EDT     Call [facility] #[phone] with any questions

## 2023-07-31 LAB
ANION GAP SERPL CALCULATED.3IONS-SCNC: 9.8 MMOL/L (ref 5–15)
BUN SERPL-MCNC: 11 MG/DL (ref 8–23)
BUN/CREAT SERPL: 9.2 (ref 7–25)
CALCIUM SPEC-SCNC: 9.2 MG/DL (ref 8.6–10.5)
CHLORIDE SERPL-SCNC: 113 MMOL/L (ref 98–107)
CO2 SERPL-SCNC: 25.2 MMOL/L (ref 22–29)
CREAT SERPL-MCNC: 1.19 MG/DL (ref 0.57–1)
DEPRECATED RDW RBC AUTO: 43.8 FL (ref 37–54)
EGFRCR SERPLBLD CKD-EPI 2021: 47.5 ML/MIN/1.73
ERYTHROCYTE [DISTWIDTH] IN BLOOD BY AUTOMATED COUNT: 12.6 % (ref 12.3–15.4)
GLUCOSE BLDC GLUCOMTR-MCNC: 128 MG/DL (ref 70–130)
GLUCOSE BLDC GLUCOMTR-MCNC: 130 MG/DL (ref 70–130)
GLUCOSE BLDC GLUCOMTR-MCNC: 143 MG/DL (ref 70–130)
GLUCOSE BLDC GLUCOMTR-MCNC: 168 MG/DL (ref 70–130)
GLUCOSE SERPL-MCNC: 130 MG/DL (ref 65–99)
HCT VFR BLD AUTO: 34.9 % (ref 34–46.6)
HGB BLD-MCNC: 11.4 G/DL (ref 12–15.9)
MCH RBC QN AUTO: 30.8 PG (ref 26.6–33)
MCHC RBC AUTO-ENTMCNC: 32.7 G/DL (ref 31.5–35.7)
MCV RBC AUTO: 94.3 FL (ref 79–97)
PLATELET # BLD AUTO: 149 10*3/MM3 (ref 140–450)
PMV BLD AUTO: 10.2 FL (ref 6–12)
POTASSIUM SERPL-SCNC: 4.4 MMOL/L (ref 3.5–5.2)
RBC # BLD AUTO: 3.7 10*6/MM3 (ref 3.77–5.28)
SODIUM SERPL-SCNC: 148 MMOL/L (ref 136–145)
WBC NRBC COR # BLD: 7.13 10*3/MM3 (ref 3.4–10.8)

## 2023-07-31 PROCEDURE — G0378 HOSPITAL OBSERVATION PER HR: HCPCS

## 2023-07-31 PROCEDURE — 82948 REAGENT STRIP/BLOOD GLUCOSE: CPT

## 2023-07-31 PROCEDURE — 80048 BASIC METABOLIC PNL TOTAL CA: CPT | Performed by: INTERNAL MEDICINE

## 2023-07-31 PROCEDURE — 85027 COMPLETE CBC AUTOMATED: CPT | Performed by: INTERNAL MEDICINE

## 2023-07-31 PROCEDURE — 36415 COLL VENOUS BLD VENIPUNCTURE: CPT | Performed by: INTERNAL MEDICINE

## 2023-07-31 PROCEDURE — 96361 HYDRATE IV INFUSION ADD-ON: CPT

## 2023-07-31 PROCEDURE — 63710000001 INSULIN LISPRO (HUMAN) PER 5 UNITS: Performed by: INTERNAL MEDICINE

## 2023-07-31 RX ADMIN — INSULIN LISPRO 2 UNITS: 100 INJECTION, SOLUTION INTRAVENOUS; SUBCUTANEOUS at 21:05

## 2023-07-31 RX ADMIN — SODIUM CHLORIDE 75 ML/HR: 9 INJECTION, SOLUTION INTRAVENOUS at 10:51

## 2023-07-31 RX ADMIN — DULOXETINE HYDROCHLORIDE 30 MG: 30 CAPSULE, DELAYED RELEASE ORAL at 08:21

## 2023-07-31 RX ADMIN — SENNOSIDES AND DOCUSATE SODIUM 2 TABLET: 50; 8.6 TABLET ORAL at 21:05

## 2023-07-31 RX ADMIN — HYDROCODONE BITARTRATE AND ACETAMINOPHEN 1 TABLET: 5; 325 TABLET ORAL at 06:44

## 2023-07-31 RX ADMIN — GABAPENTIN 800 MG: 400 CAPSULE ORAL at 06:43

## 2023-07-31 RX ADMIN — SENNOSIDES AND DOCUSATE SODIUM 2 TABLET: 50; 8.6 TABLET ORAL at 08:20

## 2023-07-31 RX ADMIN — GABAPENTIN 800 MG: 400 CAPSULE ORAL at 13:18

## 2023-07-31 RX ADMIN — ATORVASTATIN CALCIUM 20 MG: 20 TABLET, FILM COATED ORAL at 08:21

## 2023-07-31 RX ADMIN — GABAPENTIN 800 MG: 400 CAPSULE ORAL at 21:05

## 2023-07-31 NOTE — PROGRESS NOTES
"DAILY PROGRESS NOTE  [facility]    Patient Identification:  Name: Kristina Johnson  Age: 78 y.o.  Sex: female  :  1945  MRN: [id number]         Primary Care Physician: Moustapha Mata MD    Subjective: patient is resting; has not worked with therapy yet  Interval History: follow up for pelvic fracture, fall     Objective:    Scheduled Meds:atorvastatin, 20 mg, Oral, Daily  DULoxetine, 30 mg, Oral, Daily  gabapentin, 800 mg, Oral, Q8H  insulin lispro, 2-9 Units, Subcutaneous, 4x Daily AC & at Bedtime  losartan, 100 mg, Oral, Q24H  senna-docusate sodium, 2 tablet, Oral, BID      Continuous Infusions:     Vital signs in last 24 hours:  Temp:  [97.6 øF (36.4 øC)-98.4 øF (36.9 øC)] 97.6 øF (36.4 øC)  Heart Rate:  [82-92] 92  Resp:  [16] 16  BP: ()/(59-68) 116/62    Intake/Output:    Intake/Output Summary (Last 24 hours) at 2023 1740  Last data filed at 2023 1714  Gross per 24 hour   Intake 1200 ml   Output 1100 ml   Net 100 ml       Exam:  /62 (BP Location: Left arm, Patient Position: Lying)   Pulse 92   Temp 97.6 øF (36.4 øC) (Oral)   Resp 16   Ht 152.4 cm (60\")   Wt 68 kg (150 lb)   SpO2 96%   BMI 29.29 kg/mý     General Appearance:    Alert, cooperative, no distress, AAOx3                          Head:    Normocephalic, without obvious abnormality, atraumatic                           Eyes:    PERRL, conjunctivae/corneas clear, EOM's intact, both eyes                         Throat:   Lips, tongue, gums normal; oral mucosa pink and moist                           Neck:   Supple, symmetrical, trachea midline, no JVD                         Lungs:    Clear to auscultation bilaterally, respirations unlabored                 Chest Wall:    No tenderness or deformity                          Heart:    Regular rate and rhythm, S1 and S2 normal, no murmur,no  rub or gallop                  Abdomen:     Soft, nontender, bowel sounds active, no masses, no organomegaly       "            Extremities:   Extremities normal, atraumatic, no cyanosis or edema                        Pulses:   Pulses palpable in all extremities                            Skin:   Skin is warm and dry,  no rashes or palpable lesions                    Data Review:  Labs in chart were reviewed.      Assessment:  Active Hospital Problems    Diagnosis  POA    **Pelvis fracture [S32.9XXA]  Yes      Resolved Hospital Problems   No resolved problems to display.   Fall  Diabetes  Hyponatremia  ckd3    Plan:  Will continue to monitor  Physical therapy to see  Appreciate input from ortho  Nonoperative management  Plan is for home with family   Medium risk    Silvia Herve Mckeon MD  7/31/2023  17:40 EDT

## 2023-07-31 NOTE — CASE MANAGEMENT/SOCIAL WORK
Discharge Planning Assessment  [facility]     Patient Name: Kristina Johnson  MRN: [id number]  Today's Date: 7/31/2023    Admit Date: 7/30/2023    Plan: Home with family support & [facility].   Discharge Needs Assessment       Row Name 07/31/23 1020       Living Environment    People in Home alone    Unique Family Situation Friend/Emery will be staying with her after discharge to help her while she recovers.    Current Living Arrangements home    Primary Care Provided by self    Provides Primary Care For no one    Family Caregiver if Needed friend(s);other relative(s)    Quality of Family Relationships helpful;involved;supportive    Able to Return to Prior Arrangements yes       Transition Planning    Patient/Family Anticipates Transition to home with family;home with help/services    Patient/Family Anticipated Services at Transition     Transportation Anticipated family or friend will provide;health plan transportation       Discharge Needs Assessment    Readmission Within the Last 30 Days no previous admission in last 30 days    Equipment Currently Used at Home none    Discharge Facility/Level of Care Needs home with home health                   Discharge Plan       Row Name 07/31/23 1021       Plan    Plan Home with family support & [facility] .    Patient/Family in Agreement with Plan yes    Plan Comments Spoke with the patient, verified current information and explained the role of the CCP. Patient said she has friend and family support. She's IADL and owns a cane & walker that she said she never uses. She has no history with  and cannot remember the  agency she's worked with in the past. Patient plans to d/c home with family/friend support and . She said her friend/Emery will be staying with her while she recovers. She requests a referral to [facility] . Referral sent in [facility]. CCP will follow.                  Continued Care and Services - Admitted Since 7/30/2023       Home Medical Care        Service Provider Request Status Selected Services Address Phone Fax Patient Preferred     [facility] Pending - Request Sent N/A [address] [address] [phone] --                     Demographic Summary       Row Name 07/31/23 1020       General Information    Admission Type inpatient    Reason for Consult discharge planning    Preferred Language English       Contact Information    Permission Granted to Share Info With ;family/designee                   Functional Status       Row Name 07/31/23 1020       Functional Status    Usual Activity Tolerance good       Functional Status, IADL    Medications independent    Meal Preparation independent    Housekeeping independent    Laundry independent    Shopping independent       Mental Status Summary    Recent Changes in Mental Status/Cognitive Functioning no changes                   Psychosocial       Row Name 07/31/23 1020       Coping/Stress    Patient Personal Strengths able to adapt    Sources of Support friend(s);other family members    Reaction to Health Status accepting    Understanding of Condition and Treatment adequate understanding of medical condition;adequate understanding of treatment       Developmental Stage (Eriksson's)    Developmental Stage Stage 8 (65 years-death/Late Adulthood) Integrity vs. Despair                    Lara HAMMOND, RN

## 2023-07-31 NOTE — SIGNIFICANT NOTE
07/31/23 1004   OTHER   Discipline physical therapist   Rehab Time/Intention   Session Not Performed other (see comments)  (admit s/p fall resulting in acute fractures of the left pubic body and left superior and inferior pubic rami; awaiting ortho consult on tx & WB'ing status; f/u pending consult)   Therapy Assessment/Plan (PT)   Criteria for Skilled Interventions Met (PT) yes   Recommendation   PT - Next Appointment 08/01/23

## 2023-07-31 NOTE — DISCHARGE PLACEMENT REQUEST
"Kristina Martin (78 y.o. Female)       Date of Birth   1945    Social Security Number       Address   [address] [place] [address]    Home Phone   [phone]    MRN   [id number]       Shinto   None    Marital Status   Single                            Admission Date   7/30/23    Admission Type   Emergency    Admitting Provider   Pedro Escobar MD    Attending Provider   Pedro Escobar MD    Department, Room/Bed   [address], P878/1       Discharge Date       Discharge Disposition       Discharge Destination                                 Attending Provider: Pedro Escobar MD    Allergies: Codeine, Ace Inhibitors    Isolation: None   Infection: None   Code Status: CPR    Ht: 152.4 cm (60\")   Wt: 68 kg (150 lb)    Admission Cmt: None   Principal Problem: Pelvis fracture [S32.9XXA]                   Active Insurance as of 7/30/2023       Primary Coverage       Payor Plan Insurance Group Employer/Plan Group    ANTHEM MEDICARE REPLACEMENT ANTHEM MEDICARE ADVANTAGE [id number]       Payor Plan Address Payor Plan Phone Number Payor Plan Fax Number Effective Dates    [facility] [id number] [phone]  1/1/2021 - None Entered    [facility] [address]         Subscriber Name Subscriber Birth Date Member ID       KRISTINA MARTIN 1945 [id number]                     Emergency Contacts        (Rel.) Home Phone Work Phone Mobile Phone    Fe Briceño (Relative) [phone] -- --              "

## 2023-07-31 NOTE — PLAN OF CARE
Goal Outcome Evaluation:        Patient admitted with a pelvic fracture sustained from a fall at home.  Patient is alert and oriented.  Norco, and dilaudid are ordered for pain management.  Voiding per michelle.  Teaching provided on glucose monitoring.  Patient verbalized understanding. Will continue to monitor and update accordingly.

## 2023-07-31 NOTE — PLAN OF CARE
Goal Outcome Evaluation:           Progress: improving  Outcome Evaluation: Patient here with a closed displaced pelvic fracture. VSS. PO pain medication helping with pain. Voiding fine per purewick. Patient is diabetic. No surgery for now. Education provided on blood sugar and blood pressure  monitoring . Patient verbalized undertstanding.

## 2023-08-01 LAB
ANION GAP SERPL CALCULATED.3IONS-SCNC: 8.4 MMOL/L (ref 5–15)
BUN SERPL-MCNC: 13 MG/DL (ref 8–23)
BUN/CREAT SERPL: 12.4 (ref 7–25)
CALCIUM SPEC-SCNC: 8.5 MG/DL (ref 8.6–10.5)
CHLORIDE SERPL-SCNC: 112 MMOL/L (ref 98–107)
CO2 SERPL-SCNC: 23.6 MMOL/L (ref 22–29)
CREAT SERPL-MCNC: 1.05 MG/DL (ref 0.57–1)
DEPRECATED RDW RBC AUTO: 42.1 FL (ref 37–54)
EGFRCR SERPLBLD CKD-EPI 2021: 55.2 ML/MIN/1.73
ERYTHROCYTE [DISTWIDTH] IN BLOOD BY AUTOMATED COUNT: 12.3 % (ref 12.3–15.4)
GLUCOSE BLDC GLUCOMTR-MCNC: 133 MG/DL (ref 70–130)
GLUCOSE BLDC GLUCOMTR-MCNC: 141 MG/DL (ref 70–130)
GLUCOSE BLDC GLUCOMTR-MCNC: 165 MG/DL (ref 70–130)
GLUCOSE BLDC GLUCOMTR-MCNC: 178 MG/DL (ref 70–130)
GLUCOSE SERPL-MCNC: 148 MG/DL (ref 65–99)
HCT VFR BLD AUTO: 34.4 % (ref 34–46.6)
HGB BLD-MCNC: 11.3 G/DL (ref 12–15.9)
MAGNESIUM SERPL-MCNC: 2.4 MG/DL (ref 1.6–2.4)
MCH RBC QN AUTO: 30.7 PG (ref 26.6–33)
MCHC RBC AUTO-ENTMCNC: 32.8 G/DL (ref 31.5–35.7)
MCV RBC AUTO: 93.5 FL (ref 79–97)
NT-PROBNP SERPL-MCNC: 140 PG/ML (ref 0–1800)
PLATELET # BLD AUTO: 141 10*3/MM3 (ref 140–450)
PMV BLD AUTO: 10.1 FL (ref 6–12)
POTASSIUM SERPL-SCNC: 4.2 MMOL/L (ref 3.5–5.2)
RBC # BLD AUTO: 3.68 10*6/MM3 (ref 3.77–5.28)
SODIUM SERPL-SCNC: 144 MMOL/L (ref 136–145)
WBC NRBC COR # BLD: 7.06 10*3/MM3 (ref 3.4–10.8)

## 2023-08-01 PROCEDURE — 97530 THERAPEUTIC ACTIVITIES: CPT

## 2023-08-01 PROCEDURE — G0378 HOSPITAL OBSERVATION PER HR: HCPCS

## 2023-08-01 PROCEDURE — 85027 COMPLETE CBC AUTOMATED: CPT | Performed by: INTERNAL MEDICINE

## 2023-08-01 PROCEDURE — 63710000001 INSULIN LISPRO (HUMAN) PER 5 UNITS: Performed by: INTERNAL MEDICINE

## 2023-08-01 PROCEDURE — 97161 PT EVAL LOW COMPLEX 20 MIN: CPT

## 2023-08-01 PROCEDURE — 83735 ASSAY OF MAGNESIUM: CPT | Performed by: INTERNAL MEDICINE

## 2023-08-01 PROCEDURE — 80048 BASIC METABOLIC PNL TOTAL CA: CPT | Performed by: INTERNAL MEDICINE

## 2023-08-01 PROCEDURE — 83880 ASSAY OF NATRIURETIC PEPTIDE: CPT | Performed by: HOSPITALIST

## 2023-08-01 PROCEDURE — 82948 REAGENT STRIP/BLOOD GLUCOSE: CPT

## 2023-08-01 RX ORDER — ATORVASTATIN CALCIUM 20 MG/1
10 TABLET, FILM COATED ORAL NIGHTLY
Status: DISCONTINUED | OUTPATIENT
Start: 2023-08-01 | End: 2023-08-03 | Stop reason: HOSPADM

## 2023-08-01 RX ORDER — PANTOPRAZOLE SODIUM 40 MG/1
40 TABLET, DELAYED RELEASE ORAL
Status: DISCONTINUED | OUTPATIENT
Start: 2023-08-02 | End: 2023-08-03 | Stop reason: HOSPADM

## 2023-08-01 RX ORDER — ASPIRIN 81 MG/1
81 TABLET, CHEWABLE ORAL DAILY
Status: DISCONTINUED | OUTPATIENT
Start: 2023-08-01 | End: 2023-08-03 | Stop reason: HOSPADM

## 2023-08-01 RX ADMIN — ATORVASTATIN CALCIUM 20 MG: 20 TABLET, FILM COATED ORAL at 08:38

## 2023-08-01 RX ADMIN — SENNOSIDES AND DOCUSATE SODIUM 2 TABLET: 50; 8.6 TABLET ORAL at 08:38

## 2023-08-01 RX ADMIN — INSULIN LISPRO 2 UNITS: 100 INJECTION, SOLUTION INTRAVENOUS; SUBCUTANEOUS at 21:31

## 2023-08-01 RX ADMIN — GABAPENTIN 800 MG: 400 CAPSULE ORAL at 14:06

## 2023-08-01 RX ADMIN — ATORVASTATIN CALCIUM 10 MG: 20 TABLET, FILM COATED ORAL at 21:31

## 2023-08-01 RX ADMIN — ASPIRIN 81 MG: 81 TABLET, CHEWABLE ORAL at 17:28

## 2023-08-01 RX ADMIN — DULOXETINE HYDROCHLORIDE 30 MG: 30 CAPSULE, DELAYED RELEASE ORAL at 08:38

## 2023-08-01 RX ADMIN — HYDROCODONE BITARTRATE AND ACETAMINOPHEN 1 TABLET: 5; 325 TABLET ORAL at 12:06

## 2023-08-01 RX ADMIN — INSULIN LISPRO 2 UNITS: 100 INJECTION, SOLUTION INTRAVENOUS; SUBCUTANEOUS at 12:06

## 2023-08-01 RX ADMIN — GABAPENTIN 800 MG: 400 CAPSULE ORAL at 21:31

## 2023-08-01 RX ADMIN — GABAPENTIN 800 MG: 400 CAPSULE ORAL at 05:36

## 2023-08-01 RX ADMIN — LOSARTAN POTASSIUM 100 MG: 100 TABLET, FILM COATED ORAL at 08:38

## 2023-08-01 RX ADMIN — HYDROCODONE BITARTRATE AND ACETAMINOPHEN 1 TABLET: 5; 325 TABLET ORAL at 05:38

## 2023-08-01 NOTE — THERAPY EVALUATION
Patient Name: Kristina Johnosn  : 1945    MRN: [id number]                              Today's Date: 2023       Admit Date: 2023    Visit Dx:     ICD-10-CM ICD-9-CM   1. Closed displaced fracture of pelvis, unspecified part of pelvis, initial encounter  S32.9XXA 808.8   2. Fall in home, initial encounter  W19.XXXA E888.9    Y92.009 E849.0     Patient Active Problem List   Diagnosis    Pelvis fracture     Past Medical History:   Diagnosis Date    Diabetes mellitus      History reviewed. No pertinent surgical history.   General Information       Row Name 23 1100          Physical Therapy Time and Intention    Document Type evaluation  -CS     Mode of Treatment individual therapy;physical therapy  -CS       Row Name 23 1100          General Information    Patient Profile Reviewed yes  -CS     Prior Level of Function independent:;all household mobility;gait;transfer;bed mobility  -CS     Existing Precautions/Restrictions fall  -CS     Barriers to Rehab none identified  -CS       Row Name 23 1100          Living Environment    People in Home alone  -CS       Row Name 23 1100          Home Main Entrance    Number of Stairs, Main Entrance none;other (see comments)  elevator  -CS       Row Name 23 1100          Stairs Within Home, Primary    Number of Stairs, Within Home, Primary none  -CS       Row Name 23 1100          Cognition    Orientation Status (Cognition) oriented x 4  -CS       Row Name 23 1100          Safety Issues, Functional Mobility    Impairments Affecting Function (Mobility) endurance/activity tolerance;pain;strength  -CS               User Key  (r) = Recorded By, (t) = Taken By, (c) = Cosigned By      Initials Name Provider Type    CS Jennifer John PT Physical Therapist                   Mobility       Row Name 23 1100          Bed Mobility    Bed Mobility supine-sit  -CS     Supine-Sit [place] (Bed Mobility) moderate assist (50% patient  effort);verbal cues  -CS     Assistive Device (Bed Mobility) bed rails;head of bed elevated  -CS     Comment, (Bed Mobility) increased time to complete with cues provided for sequencing; UIC at end of session  -CS       Row Name 08/01/23 1100          Sit-Stand Transfer    Sit-Stand [place] (Transfers) moderate assist (50% patient effort);verbal cues  -CS     Assistive Device (Sit-Stand Transfers) walker, front-wheeled  -CS     Comment, (Sit-Stand Transfer) VC for UE placement  -CS       Row Name 08/01/23 1100          Gait/Stairs (Locomotion)    [place] Level (Gait) contact guard;verbal cues  -CS     Assistive Device (Gait) walker, front-wheeled  -CS     Distance in Feet (Gait) 5' (bed to chair)  -CS     Deviations/Abnormal Patterns (Gait) antalgic;tatiana decreased;festinating/shuffling;gait speed decreased;stride length decreased;weight shifting decreased  -CS     Bilateral Gait Deviations forward flexed posture;heel strike decreased  -CS     Right Sided Gait Deviations weight shift ability decreased  -CS     [place] Level (Stairs) not tested  -CS     Comment, (Gait/Stairs) very slow shuffling gait; difficulty with B LE clerarance secondary to pain  -CS       Row Name 08/01/23 1100          Mobility    Extremity Weight-bearing Status left lower extremity  -CS     Left Lower Extremity (Weight-bearing Status) weight-bearing as tolerated (WBAT)  -               User Key  (r) = Recorded By, (t) = Taken By, (c) = Cosigned By      Initials Name Provider Type    Jennifer Riley, PT Physical Therapist                   Obj/Interventions       Row Name 08/01/23 1101          Range of Motion Comprehensive    General Range of Motion bilateral lower extremity ROM WFL  -CS     Comment, General Range of Motion L LE limited secondary to fracture; R LE WFL  -CS       Row Name 08/01/23 1101          Strength Comprehensive (MMT)    General Manual Muscle Testing (MMT) Assessment other (see comments)  -      Comment, General Manual Muscle Testing (MMT) Assessment generalized weakness; B LE grossly 3/5  -CS       Row Name 08/01/23 1101          Motor Skills    Therapeutic Exercise other (see comments)  discussed B LE HEP  -CS       Row Name 08/01/23 1101          Balance    Balance Assessment sitting static balance;sitting dynamic balance;standing static balance;standing dynamic balance  -CS     Static Sitting Balance standby assist  -CS     Dynamic Sitting Balance standby assist  -CS     Position, Sitting Balance unsupported;sitting edge of bed  -CS     Static Standing Balance contact guard  -CS     Dynamic Standing Balance contact guard  -CS     Position/Device Used, Standing Balance supported;walker, front-wheeled  -CS               User Key  (r) = Recorded By, (t) = Taken By, (c) = Cosigned By      Initials Name Provider Type    CS Jennifer John, PT Physical Therapist                   Goals/Plan       Row Name 08/01/23 1136          Bed Mobility Goal 1 (PT)    Activity/Assistive Device (Bed Mobility Goal 1, PT) bed mobility activities, all  -CS     [place] Level/Cues Needed (Bed Mobility Goal 1, PT) minimum assist (75% or more patient effort)  -CS     Time Frame (Bed Mobility Goal 1, PT) 1 week  -       Row Name 08/01/23 1136          Transfer Goal 1 (PT)    Activity/Assistive Device (Transfer Goal 1, PT) sit-to-stand/stand-to-sit;bed-to-chair/chair-to-bed  -CS     [place] Level/Cues Needed (Transfer Goal 1, PT) minimum assist (75% or more patient effort)  -CS     Time Frame (Transfer Goal 1, PT) 1 week  -       Row Name 08/01/23 1136          Gait Training Goal 1 (PT)    Activity/Assistive Device (Gait Training Goal 1, PT) gait (walking locomotion);assistive device use;decrease fall risk;improve balance and speed;increase endurance/gait distance;diminish gait deviation  -CS     [place] Level (Gait Training Goal 1, PT) minimum assist (75% or more patient effort)  -CS     Distance (Gait Training  Goal 1, PT) 25'  -CS     Time Frame (Gait Training Goal 1, PT) 1 week  -CS               User Key  (r) = Recorded By, (t) = Taken By, (c) = Cosigned By      Initials Name Provider Type    CS Jennifer John, PT Physical Therapist                   Clinical Impression       Row Name 08/01/23 1102          Pain    Pretreatment Pain Rating 7/10  -CS     Posttreatment Pain Rating 7/10  -CS     Pain Location generalized  -CS     Pain Location - groin  -CS     Pain Intervention(s) Rest;Repositioned  -CS       Row Name 08/01/23 1102          Plan of Care Review    Plan of Care Reviewed With patient  -CS     Outcome Evaluation Pt is a 77 y/o F admitted to [facility] after falling at home resulting in a L superior & inferior pubic rami fracture. Per ortho pt plans non-operative management and is WBAT. Pt received in bed upon arrival and agreeable to PT eval. Pt reports she lives alone with a elevator to enter and is (I) with mobility at BL. Pt presents to PT with expected pain, generalized weakness, and impaired functional mobility. Pt required mod A and increased time to complete with cues provided for sequencing to reach sitting EOB. Pt stood requiring mod A and took a few steps to chair c RW requiring CGA. Pt demo's a very slow shuffling gait with difficulty clearing B LE secondary to pain. Pt UIC and encouraged to complete HEP and complete transfers with staff for toileting needs. PT recommends SNF at D/C to address stated deficits to assist in returning to PLOF before home.  -CS       Row Name 08/01/23 1102          Therapy Assessment/Plan (PT)    Patient/Family Therapy Goals Statement (PT) to get stronger  -CS     Rehab Potential (PT) good, to achieve stated therapy goals  -CS     Criteria for Skilled Interventions Met (PT) yes;meets criteria  -CS     Therapy Frequency (PT) 6 times/wk  -CS       Row Name 08/01/23 1102          Positioning and Restraints    Pre-Treatment Position in bed  -CS     Post Treatment Position  chair  -CS     In Chair notified nsg;reclined;call light within reach;encouraged to call for assist;exit alarm on;heels elevated  -CS               User Key  (r) = Recorded By, (t) = Taken By, (c) = Cosigned By      Initials Name Provider Type    CS Jennifer John, PT Physical Therapist                   Outcome Measures       Row Name 08/01/23 1136 08/01/23 0838       How much help from another person do you currently need...    Turning from your back to your side while in flat bed without using bedrails? 3  -CS 3  -DD    Moving from lying on back to sitting on the side of a flat bed without bedrails? 2  -CS 3  -DD    Moving to and from a bed to a chair (including a wheelchair)? 3  -CS 3  -DD    Standing up from a chair using your arms (e.g., wheelchair, bedside chair)? 2  -CS 3  -DD    Climbing 3-5 steps with a railing? 1  -CS 3  -DD    To walk in hospital room? 3  -CS 3  -DD    AM-PAC 6 Clicks Score (PT) 14  -CS 18  -DD    Highest level of mobility 4 --> Transferred to chair/commode  -CS 6 --> Walked 10 steps or more  -DD      Row Name 08/01/23 1136          Functional Assessment    Outcome Measure Options AM-PAC 6 Clicks Basic Mobility (PT)  -CS               User Key  (r) = Recorded By, (t) = Taken By, (c) = Cosigned By      Initials Name Provider Type    Jennifer Riley, PT Physical Therapist    Radha Rivas, RN Registered Nurse                                 Physical Therapy Education       Title: PT OT SLP Therapies (Done)       Topic: Physical Therapy (Done)       Point: Mobility training (Done)       Learning Progress Summary             Patient Acceptance, E,TB, VU,DU by CS at 8/1/2023 1137                         Point: Home exercise program (Done)       Learning Progress Summary             Patient Acceptance, E,TB, VU,DU by CS at 8/1/2023 1137                         Point: Body mechanics (Done)       Learning Progress Summary             Patient Acceptance, E,TB, VU,DU by CS at 8/1/2023 1137                          Point: Precautions (Done)       Learning Progress Summary             Patient Acceptance, E,TB, VU,DU by  at 8/1/2023 1137                                         User Key       Initials Effective Dates Name Provider Type Discipline    CS 09/22/22 -  Jennifer John, PT Physical Therapist PT                  PT Recommendation and Plan     Plan of Care Reviewed With: patient  Outcome Evaluation: Pt is a 79 y/o F admitted to [facility] after falling at home resulting in a L superior & inferior pubic rami fracture. Per ortho pt plans non-operative management and is WBAT. Pt received in bed upon arrival and agreeable to PT eval. Pt reports she lives alone with a elevator to enter and is (I) with mobility at . Pt presents to PT with expected pain, generalized weakness, and impaired functional mobility. Pt required mod A and increased time to complete with cues provided for sequencing to reach sitting EOB. Pt stood requiring mod A and took a few steps to chair c RW requiring CGA. Pt demo's a very slow shuffling gait with difficulty clearing B LE secondary to pain. Pt UIC and encouraged to complete HEP and complete transfers with staff for toileting needs. PT recommends SNF at D/C to address stated deficits to assist in returning to PLOF before home.     Time Calculation:         PT Charges       Row Name 08/01/23 1137             Time Calculation    Start Time 1027  -CS      Stop Time 1051  -CS      Time Calculation (min) 24 min  -CS      PT Received On 08/01/23  -      PT - Next Appointment 08/02/23  -      PT Goal Re-Cert Due Date 08/08/23  -         Time Calculation- PT    Total Timed Code Minutes- PT 23 minute(s)  -CS         Timed Charges    50811 - PT Therapeutic Activity Minutes 23  -CS         Total Minutes    Timed Charges Total Minutes 23  -CS       Total Minutes 23  -CS                User Key  (r) = Recorded By, (t) = Taken By, (c) = Cosigned By      Initials Name Provider Type     CS Jennifer John, PT Physical Therapist                  Therapy Charges for Today       Code Description Service Date Service Provider Modifiers Qty    74893328150 HC PT THERAPEUTIC ACT EA 15 MIN 8/1/2023 Jennifer John, PT GP 2    30319660148 HC PT EVAL LOW COMPLEXITY 3 8/1/2023 Jennifer John, PT GP 1            PT G-Codes  Outcome Measure Options: AM-PAC 6 Clicks Basic Mobility (PT)  AM-PAC 6 Clicks Score (PT): 14  PT Discharge Summary  Anticipated Discharge Disposition (PT): skilled nursing facility    Jennifer John PT  8/1/2023

## 2023-08-01 NOTE — PLAN OF CARE
Goal Outcome Evaluation:  Plan of Care Reviewed With: patient        Progress: improving  Outcome Evaluation: Patient here with a pelvic fracture. Vss. Po pain medication helping with pain. Voiding per purewick. Patient have orders to get up and ambulate. Zero complain of pain at this time. Education provided on safety and blood sugar monitoring.

## 2023-08-01 NOTE — PLAN OF CARE
Goal Outcome Evaluation:         Patient was admitted with a left pelvis fracture sustained from a fall at home.   Patient is Alert and oriented. Voiding per purshea.  Medicated with norco for pain management.  Teaching provided on glucose monitoring.  Patient worked with physical therapy this am.  Up to chair.  Plan is for patient to discharge to a skilled nursing facility.

## 2023-08-01 NOTE — CASE MANAGEMENT/SOCIAL WORK
Continued Stay Note  [facility]     Patient Name: Kristina Johnson  MRN: [id number]  Today's Date: 8/1/2023    Admit Date: 7/30/2023    Plan: SNF -- Referrals Pending.   Discharge Plan       Row Name 08/01/23 1348       Plan    Plan SNF -- Referrals Pending.    Patient/Family in Agreement with Plan yes    Plan Comments Physical Therapy eval noted. Discussed with the patient who would like to go to [facility]. Referral sent in Epic. Spoke with [facility] who said they're not in-network with the patient's insurance. Updated the patient and reviewed the CMS Compare SNF List. The patient requests referrals to [facility], [facility]. Referrals sent in Epic. Spoke with [facility], Carla/[facility] and Nyla/[facility]. Await SNFs determinations.                   Discharge Codes    No documentation.                 Expected Discharge Date and Time       Expected Discharge Date Expected Discharge Time    Aug 1, 2023               Lara HAMMOND RN

## 2023-08-01 NOTE — PROGRESS NOTES
"DAILY PROGRESS NOTE  [facility]    Patient Identification:  Name: Kristina Johnson  Age: 78 y.o.  Sex: female  :  1945  MRN: [id number]         Primary Care Physician: Moustapha Mata MD    Subjective:   Doing better with no new complaints and family at bedside.    Interval History: follow up for pelvic fracture, fall     Objective:    Scheduled Meds:atorvastatin, 20 mg, Oral, Daily  DULoxetine, 30 mg, Oral, Daily  gabapentin, 800 mg, Oral, Q8H  insulin lispro, 2-9 Units, Subcutaneous, 4x Daily AC & at Bedtime  losartan, 100 mg, Oral, Q24H  senna-docusate sodium, 2 tablet, Oral, BID      Continuous Infusions:     Vital signs in last 24 hours:  Temp:  [97.8 øF (36.6 øC)-98.7 øF (37.1 øC)] 98.5 øF (36.9 øC)  Heart Rate:  [85-97] 85  Resp:  [16-18] 18  BP: ()/(51-70) 120/60    Intake/Output:    Intake/Output Summary (Last 24 hours) at 2023 1630  Last data filed at 2023 0600  Gross per 24 hour   Intake 1040 ml   Output 1800 ml   Net -760 ml         Exam:  /60 (BP Location: Left arm, Patient Position: Sitting)   Pulse 85   Temp 98.5 øF (36.9 øC) (Oral)   Resp 18   Ht 152.4 cm (60\")   Wt 68 kg (150 lb)   SpO2 95%   BMI 29.29 kg/mý     General Appearance:    Alert, cooperative, no distress, AAOx3                          Head:    Normocephalic, without obvious abnormality, atraumatic                           Eyes:    PERRL, conjunctivae/corneas clear, EOM's intact, both eyes                         Throat:   Lips, tongue, gums normal; oral mucosa pink and moist                           Neck:   Supple, symmetrical, trachea midline, no JVD                         Lungs:    Clear to auscultation bilaterally, respirations unlabored                 Chest Wall:    No tenderness or deformity                          Heart:    Regular rate and rhythm, S1 and S2 normal, no murmur,no  rub or gallop                  Abdomen:     Soft, nontender, bowel sounds active, no masses, no " organomegaly                  Extremities:   Extremities normal, atraumatic, no cyanosis or edema                        Pulses:   Pulses palpable in all extremities                            Skin:   Skin is warm and dry,  no rashes or palpable lesions                    Data Review:  Lab Results (last 24 hours)       Procedure Component Value Units Date/Time    POC Glucose Once [[id number]]  (Abnormal) Collected: 08/01/23 1146    Specimen: Blood Updated: 08/01/23 1153     Glucose 165 mg/dL      Comment: Meter: [id number] : [id number] Nico GOMEZ       POC Glucose Once [[id number]]  (Abnormal) Collected: 08/01/23 0749    Specimen: Blood Updated: 08/01/23 0752     Glucose 141 mg/dL      Comment: Meter: [id number] : [id number] Nico GOMEZ       Basic Metabolic Panel [[id number]]  (Abnormal) Collected: 08/01/23 0432    Specimen: Blood Updated: 08/01/23 0523     Glucose 148 mg/dL      BUN 13 mg/dL      Creatinine 1.05 mg/dL      Sodium 144 mmol/L      Potassium 4.2 mmol/L      Chloride 112 mmol/L      CO2 23.6 mmol/L      Calcium 8.5 mg/dL      BUN/Creatinine Ratio 12.4     Anion Gap 8.4 mmol/L      eGFR 55.2 mL/min/1.73     Narrative:      GFR Normal >60  Chronic Kidney Disease <60  Kidney Failure <15    The GFR formula is only valid for adults with stable renal function between ages 18 and 70.    Magnesium [[id number]]  (Normal) Collected: 08/01/23 0432    Specimen: Blood Updated: 08/01/23 0523     Magnesium 2.4 mg/dL     CBC (No Diff) [[id number]]  (Abnormal) Collected: 08/01/23 0432    Specimen: Blood Updated: 08/01/23 0506     WBC 7.06 10*3/mm3      RBC 3.68 10*6/mm3      Hemoglobin 11.3 g/dL      Hematocrit 34.4 %      MCV 93.5 fL      MCH 30.7 pg      MCHC 32.8 g/dL      RDW 12.3 %      RDW-SD 42.1 fl      MPV 10.1 fL      Platelets 141 10*3/mm3     POC Glucose Once [[id number]]  (Abnormal) Collected: 07/31/23 2049    Specimen: Blood Updated: 07/31/23 2050     Glucose 168 mg/dL      Comment: Meter:  [id number] : [id number] Robbie Precious NA       POC Glucose Once [[id number]]  (Normal) Collected: 07/31/23 1631    Specimen: Blood Updated: 07/31/23 1633     Glucose 130 mg/dL      Comment: Meter: [id number] : [id number] Shaina Darling             ASSESSMENT  Left pubic superior inferior ramus fracture  Diabetes mellitus  Hypertension  Hyperlipidemia  Neuropathy  Chronic kidney disease stage III    PLAN  CPM  Nonoperative treatment  Orthopedic surgery input appreciated  Continue home medications  Stress ulcer DVT prophylaxis  Supportive care  PT OT  Patient is full code  Discussed with family and nursing staff  Discharge planning      Pdero Escobar MD  8/1/2023  16:30 EDT

## 2023-08-02 LAB
ALBUMIN SERPL-MCNC: 3.2 G/DL (ref 3.5–5.2)
ALBUMIN/GLOB SERPL: 1.5 G/DL
ALP SERPL-CCNC: 32 U/L (ref 39–117)
ALT SERPL W P-5'-P-CCNC: 20 U/L (ref 1–33)
ANION GAP SERPL CALCULATED.3IONS-SCNC: 8 MMOL/L (ref 5–15)
AST SERPL-CCNC: 22 U/L (ref 1–32)
BASOPHILS # BLD AUTO: 0.03 10*3/MM3 (ref 0–0.2)
BASOPHILS NFR BLD AUTO: 0.4 % (ref 0–1.5)
BILIRUB SERPL-MCNC: 0.4 MG/DL (ref 0–1.2)
BUN SERPL-MCNC: 15 MG/DL (ref 8–23)
BUN/CREAT SERPL: 13.3 (ref 7–25)
CALCIUM SPEC-SCNC: 8.8 MG/DL (ref 8.6–10.5)
CHLORIDE SERPL-SCNC: 111 MMOL/L (ref 98–107)
CHOLEST SERPL-MCNC: 141 MG/DL (ref 0–200)
CO2 SERPL-SCNC: 22 MMOL/L (ref 22–29)
CREAT SERPL-MCNC: 1.13 MG/DL (ref 0.57–1)
DEPRECATED RDW RBC AUTO: 41 FL (ref 37–54)
EGFRCR SERPLBLD CKD-EPI 2021: 50.5 ML/MIN/1.73
EOSINOPHIL # BLD AUTO: 0.15 10*3/MM3 (ref 0–0.4)
EOSINOPHIL NFR BLD AUTO: 2 % (ref 0.3–6.2)
ERYTHROCYTE [DISTWIDTH] IN BLOOD BY AUTOMATED COUNT: 12.2 % (ref 12.3–15.4)
GLOBULIN UR ELPH-MCNC: 2.2 GM/DL
GLUCOSE BLDC GLUCOMTR-MCNC: 148 MG/DL (ref 70–130)
GLUCOSE BLDC GLUCOMTR-MCNC: 159 MG/DL (ref 70–130)
GLUCOSE BLDC GLUCOMTR-MCNC: 163 MG/DL (ref 70–130)
GLUCOSE BLDC GLUCOMTR-MCNC: 215 MG/DL (ref 70–130)
GLUCOSE SERPL-MCNC: 181 MG/DL (ref 65–99)
HBA1C MFR BLD: 7.1 % (ref 4.8–5.6)
HCT VFR BLD AUTO: 34.5 % (ref 34–46.6)
HDLC SERPL-MCNC: 51 MG/DL (ref 40–60)
HGB BLD-MCNC: 11.5 G/DL (ref 12–15.9)
IMM GRANULOCYTES # BLD AUTO: 0.07 10*3/MM3 (ref 0–0.05)
IMM GRANULOCYTES NFR BLD AUTO: 0.9 % (ref 0–0.5)
LDLC SERPL CALC-MCNC: 70 MG/DL (ref 0–100)
LDLC/HDLC SERPL: 1.35 {RATIO}
LYMPHOCYTES # BLD AUTO: 1.11 10*3/MM3 (ref 0.7–3.1)
LYMPHOCYTES NFR BLD AUTO: 14.9 % (ref 19.6–45.3)
MCH RBC QN AUTO: 30.8 PG (ref 26.6–33)
MCHC RBC AUTO-ENTMCNC: 33.3 G/DL (ref 31.5–35.7)
MCV RBC AUTO: 92.5 FL (ref 79–97)
MONOCYTES # BLD AUTO: 0.52 10*3/MM3 (ref 0.1–0.9)
MONOCYTES NFR BLD AUTO: 7 % (ref 5–12)
NEUTROPHILS NFR BLD AUTO: 5.56 10*3/MM3 (ref 1.7–7)
NEUTROPHILS NFR BLD AUTO: 74.8 % (ref 42.7–76)
NRBC BLD AUTO-RTO: 0 /100 WBC (ref 0–0.2)
PLATELET # BLD AUTO: 145 10*3/MM3 (ref 140–450)
PMV BLD AUTO: 10.2 FL (ref 6–12)
POTASSIUM SERPL-SCNC: 4.6 MMOL/L (ref 3.5–5.2)
PROT SERPL-MCNC: 5.4 G/DL (ref 6–8.5)
RBC # BLD AUTO: 3.73 10*6/MM3 (ref 3.77–5.28)
SODIUM SERPL-SCNC: 141 MMOL/L (ref 136–145)
TRIGL SERPL-MCNC: 107 MG/DL (ref 0–150)
TSH SERPL DL<=0.05 MIU/L-ACNC: 1.08 UIU/ML (ref 0.27–4.2)
VLDLC SERPL-MCNC: 20 MG/DL (ref 5–40)
WBC NRBC COR # BLD: 7.44 10*3/MM3 (ref 3.4–10.8)

## 2023-08-02 PROCEDURE — 83036 HEMOGLOBIN GLYCOSYLATED A1C: CPT | Performed by: HOSPITALIST

## 2023-08-02 PROCEDURE — 97166 OT EVAL MOD COMPLEX 45 MIN: CPT

## 2023-08-02 PROCEDURE — 85025 COMPLETE CBC W/AUTO DIFF WBC: CPT | Performed by: HOSPITALIST

## 2023-08-02 PROCEDURE — 82948 REAGENT STRIP/BLOOD GLUCOSE: CPT

## 2023-08-02 PROCEDURE — 97535 SELF CARE MNGMENT TRAINING: CPT

## 2023-08-02 PROCEDURE — G0378 HOSPITAL OBSERVATION PER HR: HCPCS

## 2023-08-02 PROCEDURE — 63710000001 INSULIN LISPRO (HUMAN) PER 5 UNITS: Performed by: INTERNAL MEDICINE

## 2023-08-02 PROCEDURE — 80061 LIPID PANEL: CPT | Performed by: HOSPITALIST

## 2023-08-02 PROCEDURE — 80053 COMPREHEN METABOLIC PANEL: CPT | Performed by: HOSPITALIST

## 2023-08-02 PROCEDURE — 84443 ASSAY THYROID STIM HORMONE: CPT | Performed by: HOSPITALIST

## 2023-08-02 PROCEDURE — 97530 THERAPEUTIC ACTIVITIES: CPT

## 2023-08-02 RX ADMIN — INSULIN LISPRO 4 UNITS: 100 INJECTION, SOLUTION INTRAVENOUS; SUBCUTANEOUS at 21:02

## 2023-08-02 RX ADMIN — HYDROCODONE BITARTRATE AND ACETAMINOPHEN 1 TABLET: 5; 325 TABLET ORAL at 08:01

## 2023-08-02 RX ADMIN — INSULIN LISPRO 2 UNITS: 100 INJECTION, SOLUTION INTRAVENOUS; SUBCUTANEOUS at 16:47

## 2023-08-02 RX ADMIN — INSULIN LISPRO 2 UNITS: 100 INJECTION, SOLUTION INTRAVENOUS; SUBCUTANEOUS at 08:01

## 2023-08-02 RX ADMIN — ASPIRIN 81 MG: 81 TABLET, CHEWABLE ORAL at 08:01

## 2023-08-02 RX ADMIN — HYDROCODONE BITARTRATE AND ACETAMINOPHEN 1 TABLET: 5; 325 TABLET ORAL at 14:32

## 2023-08-02 RX ADMIN — GABAPENTIN 800 MG: 400 CAPSULE ORAL at 05:28

## 2023-08-02 RX ADMIN — ATORVASTATIN CALCIUM 10 MG: 20 TABLET, FILM COATED ORAL at 20:53

## 2023-08-02 RX ADMIN — DULOXETINE HYDROCHLORIDE 30 MG: 30 CAPSULE, DELAYED RELEASE ORAL at 08:01

## 2023-08-02 RX ADMIN — PANTOPRAZOLE SODIUM 40 MG: 40 TABLET, DELAYED RELEASE ORAL at 05:28

## 2023-08-02 RX ADMIN — GABAPENTIN 800 MG: 400 CAPSULE ORAL at 14:29

## 2023-08-02 RX ADMIN — HYDROCODONE BITARTRATE AND ACETAMINOPHEN 1 TABLET: 5; 325 TABLET ORAL at 18:05

## 2023-08-02 RX ADMIN — GABAPENTIN 800 MG: 400 CAPSULE ORAL at 21:56

## 2023-08-02 NOTE — THERAPY TREATMENT NOTE
Patient Name: Kristina Johnson  : 1945    MRN: [id number]                              Today's Date: 2023       Admit Date: 2023    Visit Dx:     ICD-10-CM ICD-9-CM   1. Closed displaced fracture of pelvis, unspecified part of pelvis, initial encounter  S32.9XXA 808.8   2. Fall in home, initial encounter  W19.XXXA E888.9    Y92.009 E849.0     Patient Active Problem List   Diagnosis    Pelvis fracture     Past Medical History:   Diagnosis Date    Diabetes mellitus      History reviewed. No pertinent surgical history.   General Information       Row Name 23 1425          Physical Therapy Time and Intention    Document Type therapy note (daily note)  -CS     Mode of Treatment physical therapy  -CS       Row Name 23 1425          General Information    Patient Profile Reviewed yes  -CS     Existing Precautions/Restrictions fall  -CS       Row Name 23 1425          Cognition    Orientation Status (Cognition) oriented x 4  -CS       Row Name 23 142          Safety Issues, Functional Mobility    Impairments Affecting Function (Mobility) endurance/activity tolerance;pain;strength  -CS               User Key  (r) = Recorded By, (t) = Taken By, (c) = Cosigned By      Initials Name Provider Type    CS Jennifer John, PT Physical Therapist                   Mobility       Row Name 23 1426          Bed Mobility    Bed Mobility supine-sit  -CS     Supine-Sit [place] (Bed Mobility) minimum assist (75% patient effort);verbal cues  -CS     Assistive Device (Bed Mobility) bed rails;head of bed elevated  -CS     Comment, (Bed Mobility) increased time to complete requiring assist for L LE; UIC at end of session  -CS       Row Name 23 142          Sit-Stand Transfer    Sit-Stand [place] (Transfers) moderate assist (50% patient effort);verbal cues  -CS     Assistive Device (Sit-Stand Transfers) walker, front-wheeled  -CS     Comment, (Sit-Stand Transfer) x 3 (from EOB, BSC,  and chair)  -       Row Name 08/02/23 1426          Gait/Stairs (Locomotion)    [place] Level (Gait) contact guard;verbal cues  -CS     Assistive Device (Gait) walker, front-wheeled  -CS     Distance in Feet (Gait) 5' + 5'  -CS     Deviations/Abnormal Patterns (Gait) antalgic;tatiana decreased;festinating/shuffling;gait speed decreased;stride length decreased;weight shifting decreased  -CS     Bilateral Gait Deviations forward flexed posture;heel strike decreased  -CS     Left Sided Gait Deviations weight shift ability decreased  -CS     Comment, (Gait/Stairs) very slow shuffling gait; difficulty clearing B LE  -CS       Row Name 08/02/23 1426          Mobility    Extremity Weight-bearing Status left lower extremity  -CS     Left Lower Extremity (Weight-bearing Status) weight-bearing as tolerated (WBAT)  -CS               User Key  (r) = Recorded By, (t) = Taken By, (c) = Cosigned By      Initials Name Provider Type    Jennifer Riley, PT Physical Therapist                   Obj/Interventions       Row Name 08/02/23 1427          Motor Skills    Therapeutic Exercise other (see comments)  discussed B LE HEP  -CS       Row Name 08/02/23 1427          Balance    Balance Assessment sitting static balance;sitting dynamic balance;standing static balance;standing dynamic balance  -CS     Static Sitting Balance standby assist  -CS     Dynamic Sitting Balance standby assist  -CS     Position, Sitting Balance unsupported;sitting edge of bed  -CS     Static Standing Balance standby assist  -CS     Dynamic Standing Balance contact guard  -CS     Position/Device Used, Standing Balance supported;walker, front-wheeled  -CS               User Key  (r) = Recorded By, (t) = Taken By, (c) = Cosigned By      Initials Name Provider Type    Jennifer Riley, PT Physical Therapist                   Goals/Plan    No documentation.                  Clinical Impression       Row Name 08/02/23 1427          Pain    Pretreatment  Pain Rating 0/10 - no pain  -CS     Posttreatment Pain Rating 9/10  -CS     Pain Location - Side/Orientation Left  -CS     Pain Location generalized  -CS     Pain Location - groin;hip  -CS     Pain Intervention(s) Ambulation/increased activity;Repositioned;Rest  -CS       Row Name 08/02/23 1427          Plan of Care Review    Plan of Care Reviewed With patient  -CS     Progress improving  -CS     Outcome Evaluation Pt received in bed upon arrival and agreeable to PT. Pt required increased time and min A for L LE assist to reach sitting EOB. Pt stood x 3 (from EOB, BSC, and chair) requiring mod A. Pt was able to take steps to BSC & then chair c RW requiring CGA. Pt demo's a very slow shuffling gait with difficulty clearing B LE secondary to pain. Pt fatigues quickly with activity. Pt UIC and encouraged to complete HEP and transfer for toileting needs with staff. Pt plans to D/C to rehab soon.  -CS       Row Name 08/02/23 1427          Therapy Assessment/Plan (PT)    Criteria for Skilled Interventions Met (PT) yes;meets criteria  -CS     Therapy Frequency (PT) 6 times/wk  -CS       Row Name 08/02/23 1427          Positioning and Restraints    Pre-Treatment Position in bed  -CS     Post Treatment Position chair  -CS     In Chair notified nsg;reclined;call light within reach;encouraged to call for assist;exit alarm on  -CS               User Key  (r) = Recorded By, (t) = Taken By, (c) = Cosigned By      Initials Name Provider Type    CS Jennifer John, PT Physical Therapist                   Outcome Measures       Row Name 08/02/23 1429 08/02/23 0801       How much help from another person do you currently need...    Turning from your back to your side while in flat bed without using bedrails? 3  -CS 3  -RK    Moving from lying on back to sitting on the side of a flat bed without bedrails? 3  -CS 3  -RK    Moving to and from a bed to a chair (including a wheelchair)? 3  -CS 2  -RK    Standing up from a chair using your  arms (e.g., wheelchair, bedside chair)? 2  -CS 2  -RK    Climbing 3-5 steps with a railing? 2  -CS 2  -RK    To walk in hospital room? 3  -CS 2  -RK    AM-PAC 6 Clicks Score (PT) 16  -CS 14  -RK    Highest level of mobility 5 --> Static standing  -CS 4 --> Transferred to chair/commode  -RK      Row Name 08/02/23 1429          Functional Assessment    Outcome Measure Options AM-PAC 6 Clicks Basic Mobility (PT)  -               User Key  (r) = Recorded By, (t) = Taken By, (c) = Cosigned By      Initials Name Provider Type    RK Lara Avalos, RN Registered Nurse    Jennifer Riley, PT Physical Therapist                                 Physical Therapy Education       Title: PT OT SLP Therapies (Done)       Topic: Physical Therapy (Done)       Point: Mobility training (Done)       Learning Progress Summary             Patient Acceptance, E,TB, VU,DU by  at 8/2/2023 1430    Acceptance, E,TB, VU,DU by  at 8/1/2023 1137                         Point: Home exercise program (Done)       Learning Progress Summary             Patient Acceptance, E,TB, VU,DU by  at 8/2/2023 1430    Acceptance, E,TB, VU,DU by  at 8/1/2023 1137                         Point: Body mechanics (Done)       Learning Progress Summary             Patient Acceptance, E,TB, VU,DU by  at 8/2/2023 1430    Acceptance, E,TB, VU,DU by  at 8/1/2023 1137                         Point: Precautions (Done)       Learning Progress Summary             Patient Acceptance, E,TB, VU,DU by  at 8/2/2023 1430    Acceptance, E,TB, VU,DU by  at 8/1/2023 1137                                         User Key       Initials Effective Dates Name Provider Type Discipline     09/22/22 -  Jennifer John, PT Physical Therapist PT                  PT Recommendation and Plan     Plan of Care Reviewed With: patient  Progress: improving  Outcome Evaluation: Pt received in bed upon arrival and agreeable to PT. Pt required increased time and min A for L LE  assist to reach sitting EOB. Pt stood x 3 (from EOB, BSC, and chair) requiring mod A. Pt was able to take steps to BSC & then chair c RW requiring CGA. Pt demo's a very slow shuffling gait with difficulty clearing B LE secondary to pain. Pt fatigues quickly with activity. Pt UIC and encouraged to complete HEP and transfer for toileting needs with staff. Pt plans to D/C to rehab soon.     Time Calculation:         PT Charges       Row Name 08/02/23 1430             Time Calculation    Start Time 1332  -CS      Stop Time 1400  -CS      Time Calculation (min) 28 min  -CS      PT Received On 08/02/23  -CS      PT - Next Appointment 08/03/23  -CS         Time Calculation- PT    Total Timed Code Minutes- PT 24 minute(s)  -CS         Timed Charges    95344 - PT Therapeutic Activity Minutes 24  -CS         Total Minutes    Timed Charges Total Minutes 24  -CS       Total Minutes 24  -CS                User Key  (r) = Recorded By, (t) = Taken By, (c) = Cosigned By      Initials Name Provider Type    CS Jennifer John, PT Physical Therapist                  Therapy Charges for Today       Code Description Service Date Service Provider Modifiers Qty    [id number]  PT THERAPEUTIC ACT EA 15 MIN 8/1/2023 Jennifer John, PT GP 2    [id number]  PT EVAL LOW COMPLEXITY 3 8/1/2023 Jennifer John, PT GP 1    [id number]  PT THERAPEUTIC ACT EA 15 MIN 8/2/2023 Jennifer John, PT GP 2            PT G-Codes  Outcome Measure Options: AM-PAC 6 Clicks Basic Mobility (PT)  AM-PAC 6 Clicks Score (PT): 16  PT Discharge Summary  Anticipated Discharge Disposition (PT): skilled nursing facility    Jennifer John PT  8/2/2023     Xray Chest 1 View- PORTABLE-Urgent

## 2023-08-02 NOTE — PLAN OF CARE
Goal Outcome Evaluation:  Plan of Care Reviewed With: patient           Outcome Evaluation: Pt seen for OT chandan this afternoon. Admitted with L pubic rami fx (WBAT). Pt reports she lives alone with no DELONTE. SHe was independent prior with ADLs and functional mobility. She reports she has a shower chair with a tub/shower combo. Today pt performed supine to sit with min A. Stood from EOB with mod A and use of rwx. Pt performed functional mobility to BSC and then to chair with CGA and rwx. Mod A for brief management and CGA for sushant care while standing. Dep to don socks. Pt presents with decreased strength, endurance, functional mobility and ADL performance. Pt to benefit from skilled OT to address deficits. Rec dc to SNF      Anticipated Discharge Disposition (OT): skilled nursing facility

## 2023-08-02 NOTE — PLAN OF CARE
Goal Outcome Evaluation:  Plan of Care Reviewed With: patient        Progress: improving  Outcome Evaluation: pt has nonoperative pelvic fx. pt VSS and NVI. pt ambulated with PT and is using BSC. pain is being managed with medication and positioning. plan is to D/C to rehab facility when accepted and precert approved. education provided about comorbidities.

## 2023-08-02 NOTE — PLAN OF CARE
Goal Outcome Evaluation:  Plan of Care Reviewed With: patient        Progress: improving  Outcome Evaluation: Patient here with pelvic fracture after a fall at home. VSS. PO pain medication helping with pain. Ambulating with assistance. Voiding fine per purewick . Education provided on pain control and blood sugar monitoring. Patient verbalized understanding. Refrrals pending.

## 2023-08-02 NOTE — CASE MANAGEMENT/SOCIAL WORK
Continued Stay Note  [facility]     Patient Name: Kristina Johnson  MRN: [id number]  Today's Date: 8/2/2023    Admit Date: 7/30/2023    [place] [facility] [place] & Pre-cert Pending.   Discharge Plan       Row Name 08/02/23 1510       Plan    Plan [facility] [place] -- Accepted & Pre-cert Pending.    Patient/Family in Agreement with Plan yes    Plan Comments Spoke with Marina AMAYA RN/CCP who said SNF pre-cert is still pending.                   Discharge Codes    No documentation.                 Expected Discharge Date and Time       Expected Discharge Date Expected Discharge Time    Aug 3, 2023               Lara HAMMOND RN

## 2023-08-02 NOTE — CASE MANAGEMENT/SOCIAL WORK
Continued Stay Note  [facility]     Patient Name: Kristina Johnson  MRN: [id number]  Today's Date: 8/2/2023    Admit Date: 7/30/2023    Plan: [facility] - [place] -- Accepted & Pre-cert Pending.   Discharge Plan       Row Name 08/02/23 0929       Plan    Plan [facility] - [place] -- Accepted & Pre-cert Pending.    Patient/Family in Agreement with Plan yes    Plan Comments Spoke with N[facility]/[place] [facility] and [facility] who have both accepted the patient. Updated the patient who said she would like to go to [facility]. Updated [facility]. [facility] will start pre-cert now.                   Discharge Codes    No documentation.                 Expected Discharge Date and Time       Expected Discharge Date Expected Discharge Time    Aug 3, 2023               Lara HAMMOND RN

## 2023-08-02 NOTE — THERAPY EVALUATION
Patient Name: Kristina Johnson  : 1945    MRN: [id number]                              Today's Date: 2023       Admit Date: 2023    Visit Dx:     ICD-10-CM ICD-9-CM   1. Closed displaced fracture of pelvis, unspecified part of pelvis, initial encounter  S32.9XXA 808.8   2. Fall in home, initial encounter  W19.XXXA E888.9    Y92.009 E849.0     Patient Active Problem List   Diagnosis    Pelvis fracture     Past Medical History:   Diagnosis Date    Diabetes mellitus      History reviewed. No pertinent surgical history.   General Information       Row Name 23 1406          OT Time and Intention    Document Type evaluation  -     Mode of Treatment occupational therapy  -       Row Name 23 140          General Information    Patient Profile Reviewed yes  -KA     Prior Level of Function independent:;ADL's;all household mobility  -     Existing Precautions/Restrictions fall  -       Row Name 23 1406          Living Environment    People in Home alone  -       Row Name 23 1406          Home Main Entrance    Number of Stairs, Main Entrance none  -       Row Name 23 140          Cognition    Orientation Status (Cognition) oriented x 4  -       Row Name 23 140          Safety Issues, Functional Mobility    Impairments Affecting Function (Mobility) endurance/activity tolerance;pain;strength  -               User Key  (r) = Recorded By, (t) = Taken By, (c) = Cosigned By      Initials Name Provider Type    Celi Munoz OT Occupational Therapist                     Mobility/ADL's       Row Name 23 140          Bed Mobility    Bed Mobility supine-sit  -     Supine-Sit [place] (Bed Mobility) minimum assist (75% patient effort);verbal cues  -     Assistive Device (Bed Mobility) bed rails;head of bed elevated  -     Comment, (Bed Mobility) Increased time  and assist for LLE; UIC at the end  -       Row Name 23 1406          Transfers     Transfers toilet transfer  -       Row Name 08/02/23 1406          Sit-Stand Transfer    Sit-Stand [place] (Transfers) moderate assist (50% patient effort);verbal cues  -     Assistive Device (Sit-Stand Transfers) walker front-wheeled  -IVANIA     Comment, (Sit-Stand Transfer) x3  -       Row Name 08/02/23 1406          Toilet Transfer    Type (Toilet Transfer) sit-stand;stand-sit  -     [place] Level (Toilet Transfer) contact guard;minimum assist (75% patient effort);1 person assist  -     Assistive Device (Toilet Transfer) walker, front-wheeled  -IVANIA       Row Name 08/02/23 1406          Functional Mobility    Functional Mobility- Ind. Level contact guard assist;minimum assist (75% patient effort)  -     Functional Mobility- Device walker, front-wheeled  [facility]     Functional Mobility-Distance (Feet) --  Short distance to BSC and to chair  -[facility] Name 08/02/23 1406          Activities of Daily Living    BADL Assessment/Intervention lower body dressing;toileting  -[facility] Name 08/02/23 1406          Mobility    Extremity Weight-bearing Status left lower extremity  -     Left Lower Extremity (Weight-bearing Status) weight-bearing as tolerated (WBAT)  -[facility] Name 08/02/23 1406          Lower Body Dressing Assessment/Training    [place] Level (Lower Body Dressing) lower body dressing skills;doff;don;socks;maximum assist (25% patient effort)  -[facility] Name 08/02/23 1406          Toileting Assessment/Training    [place] Level (Toileting) toileting skills;adjust/manage clothing;moderate assist (50% patient effort);change pad/brief;perform perineal hygiene;contact guard assist  -     Position (Toileting) supported sitting;supported standing  -     Comment, (Toileting) CGA for sushant care standing and mod A for brief management  -               User Key  (r) = Recorded By, (t) = Taken By, (c) = Cosigned By      Initials Name Provider Type    Celi Munoz, OT  Occupational Therapist                   Obj/Interventions       Row Name 08/02/23 1452          Range of Motion Comprehensive    General Range of Motion bilateral upper extremity ROM WFL  -       Row Name 08/02/23 1452          Strength Comprehensive (MMT)    General Manual Muscle Testing (MMT) Assessment upper extremity strength deficits identified  -     Comment, General Manual Muscle Testing (MMT) Assessment BUE grossly 4/5  -       Row Name 08/02/23 1452          Balance    Static Sitting Balance standby assist  -KA     Dynamic Sitting Balance standby assist  -KA     Position, Sitting Balance sitting edge of bed  -KA     Static Standing Balance standby assist  -KA     Dynamic Standing Balance contact guard  -     Position/Device Used, Standing Balance supported;walker, front-wheeled  -KA     Balance Interventions sitting;standing;occupation based/functional task  -               User Key  (r) = Recorded By, (t) = Taken By, (c) = Cosigned By      Initials Name Provider Type    Celi Munoz, KELTON Occupational Therapist                   Goals/Plan       Row Name 08/02/23 1502          Transfer Goal 1 (OT)    Activity/Assistive Device (Transfer Goal 1, OT) transfers, all  -KA     [place] Level/Cues Needed (Transfer Goal 1, OT) standby assist  -KA     Time Frame (Transfer Goal 1, OT) short term goal (STG);2 weeks  -KA     Progress/Outcome (Transfer Goal 1, OT) goal ongoing  -       Row Name 08/02/23 1502          Bathing Goal 1 (OT)    Activity/Device (Bathing Goal 1, OT) bathing skills, all;hand-held shower spray hose;long-handled sponge  -KA     [place] Level/Cues Needed (Bathing Goal 1, OT) standby assist  -KA     Time Frame (Bathing Goal 1, OT) short term goal (STG);2 weeks  -KA     Progress/Outcomes (Bathing Goal 1, OT) goal ongoing  -       Row Name 08/02/23 1502          Problem Specific Goal 1 (OT)    Problem Specific Goal 1 (OT) Perform functional mobility to bathroom with CGA  and use of rwx.  -     Time Frame (Problem Specific Goal 1, OT) short term goal (STG);2 weeks  -     Progress/Outcome (Problem Specific Goal 1, OT) goal ongoing  -       Row Name 08/02/23 1502          Therapy Assessment/Plan (OT)    Planned Therapy Interventions (OT) activity tolerance training;patient/caregiver education/training;adaptive equipment training;occupation/activity based interventions;strengthening exercise;ROM/therapeutic exercise;functional balance retraining;transfer/mobility retraining  -               User Key  (r) = Recorded By, (t) = Taken By, (c) = Cosigned By      Initials Name Provider Type     Celi Wilkerson, KELTON Occupational Therapist                   Clinical Impression       Row Name 08/02/23 1405          Pain Assessment    Pretreatment Pain Rating 0/10 - no pain  -     Posttreatment Pain Rating 9/10  -     Pain Location - Side/Orientation Left  -     Pain Location - hip  -     Pain Intervention(s) Ambulation/increased activity;Rest;Repositioned  -       Row Name 08/02/23 6661          Plan of Care Review    Plan of Care Reviewed With patient  -     Outcome Evaluation Pt seen for OT eval this afternoon. Admitted with L pubic rami fx (WBAT). Pt reports she lives alone with no DELONTE. SHe was independent prior with ADLs and functional mobility. She reports she has a shower chair with a tub/shower combo. Today pt performed supine to sit with min A. Stood from EOB with mod A and use of rwx. Pt performed functional mobility to BSC and then to chair with CGA and rwx. Mod A for brief management and CGA for sushant care while standing. Dep to don socks. Pt presents with decreased strength, endurance, functional mobility and ADL performance. Pt to benefit from skilled OT to address deficits. Rec dc to SNF  -       Row Name 08/02/23 9666          Therapy Assessment/Plan (OT)    Rehab Potential (OT) good, to achieve stated therapy goals  -     Criteria for Skilled Therapeutic  Interventions Met (OT) yes  -KA     Therapy Frequency (OT) 5 times/wk  -       Row Name 08/02/23 1453          Therapy Plan Review/Discharge Plan (OT)    Anticipated Discharge Disposition (OT) skilled nursing facility  -       Row Name 08/02/23 1453          Vital Signs    Pre Patient Position Supine  -KA     Intra Patient Position Standing  -KA     Post Patient Position Sitting  -       Row Name 08/02/23 1453          Positioning and Restraints    Pre-Treatment Position in bed  -KA     Post Treatment Position chair  -KA     In Chair notified nsg;reclined;call light within reach;encouraged to call for assist;exit alarm on  -KA               User Key  (r) = Recorded By, (t) = Taken By, (c) = Cosigned By      Initials Name Provider Type    Celi Munoz, KELTON Occupational Therapist                   Outcome Measures       Row Name 08/02/23 1503          How much help from another is currently needed...    Putting on and taking off regular lower body clothing? 1  -KA     Bathing (including washing, rinsing, and drying) 2  -KA     Toileting (which includes using toilet bed pan or urinal) 2  -KA     Putting on and taking off regular upper body clothing 3  -KA     Taking care of personal grooming (such as brushing teeth) 3  -KA     Eating meals 4  -KA     AM-PAC 6 Clicks Score (OT) 15  -KA       Row Name 08/02/23 1429 08/02/23 0801       How much help from another person do you currently need...    Turning from your back to your side while in flat bed without using bedrails? 3  -CS 3  -RK    Moving from lying on back to sitting on the side of a flat bed without bedrails? 3  -CS 3  -RK    Moving to and from a bed to a chair (including a wheelchair)? 3  -CS 2  -RK    Standing up from a chair using your arms (e.g., wheelchair, bedside chair)? 2  -CS 2  -RK    Climbing 3-5 steps with a railing? 2  -CS 2  -RK    To walk in hospital room? 3  -CS 2  -RK    AM-PAC 6 Clicks Score (PT) 16  -CS 14  -RK    Highest level of  mobility 5 --> Static standing  -CS 4 --> Transferred to chair/commode  -KANDACE      Row Name 08/02/23 1503 08/02/23 1429       Functional Assessment    Outcome Measure Options AM-PAC 6 Clicks Daily Activity (OT)  - AM-PAC 6 Clicks Basic Mobility (PT)  -CS              User Key  (r) = Recorded By, (t) = Taken By, (c) = Cosigned By      Initials Name Provider Type    Celi Munoz, KELTON Occupational Therapist    Lara Mccarthy, RN Registered Nurse    Jennifer Riley, PT Physical Therapist                    Occupational Therapy Education       Title: PT OT SLP Therapies (Done)       Topic: Occupational Therapy (Done)       Point: ADL training (Done)       Description:   Instruct learner(s) on proper safety adaptation and remediation techniques during self care or transfers.   Instruct in proper use of assistive devices.                  Learning Progress Summary             Patient Acceptance, E, DU,VU by  at 8/2/2023 1504                         Point: Home exercise program (Done)       Description:   Instruct learner(s) on appropriate technique for monitoring, assisting and/or progressing therapeutic exercises/activities.                  Learning Progress Summary             Patient Acceptance, E, DU,VU by IVANIA at 8/2/2023 1504                         Point: Precautions (Done)       Description:   Instruct learner(s) on prescribed precautions during self-care and functional transfers.                  Learning Progress Summary             Patient Acceptance, E, DU,VU by  at 8/2/2023 1504                                         User Key       Initials Effective Dates Name Provider Type [facility] 09/22/22 -  Celi Wilkerson OT Occupational Therapist OT                  OT Recommendation and Plan  Planned Therapy Interventions (OT): activity tolerance training, patient/caregiver education/training, adaptive equipment training, occupation/activity based interventions, strengthening exercise,  ROM/therapeutic exercise, functional balance retraining, transfer/mobility retraining  Therapy Frequency (OT): 5 times/wk  Plan of Care Review  Plan of Care Reviewed With: patient  Outcome Evaluation: Pt seen for OT eval this afternoon. Admitted with L pubic rami fx (WBAT). Pt reports she lives alone with no DELONTE. SHe was independent prior with ADLs and functional mobility. She reports she has a shower chair with a tub/shower combo. Today pt performed supine to sit with min A. Stood from EOB with mod A and use of rwx. Pt performed functional mobility to [facility] [place] [facility] and then to chair with CGA and rwx. Mod A for brief management and CGA for sushant care while standing. Dep to don socks. Pt presents with decreased strength, endurance, functional mobility and ADL performance. Pt to benefit from skilled OT to address deficits. Rec dc to SNF     Time Calculation:   Evaluation Complexity (OT)  Review Occupational Profile/Medical/Therapy History Complexity: expanded/moderate complexity  Assessment, Occupational Performance/Identification of Deficit Complexity: 3-5 performance deficits  Clinical Decision Making Complexity (OT): detailed assessment/moderate complexity  Overall Complexity of Evaluation (OT): moderate complexity     Time Calculation- OT       Row Name 08/02/23 1504             Time Calculation- OT    OT Start Time 1331  -KA      OT Stop Time 1359  -KA      OT Time Calculation (min) 28 min  -KA      Total Timed Code Minutes- OT 18 minute(s)  -KA      OT Received On 08/02/23  -KA      OT - Next Appointment 08/03/23  -KA      OT Goal Re-Cert Due Date 08/16/23  -KA         Timed Charges    93020 - OT Self Care/Mgmt Minutes 18  -KA         Untimed Charges    OT Eval/Re-eval Minutes 10  -KA         Total Minutes    Timed Charges Total Minutes 18  -KA      Untimed Charges Total Minutes 10  -KA       Total Minutes 28  -KA                User Key  (r) = Recorded By, (t) = Taken By, (c) = Cosigned By      Initials Name Provider Type     Celi Munoz OT Occupational Therapist                  Therapy Charges for Today       Code Description Service Date Service Provider Modifiers Qty    89026470883 HC OT SELF CARE/MGMT/TRAIN EA 15 MIN 8/2/2023 Celi Wilkerson OT GO 1    89608089339 HC OT EVAL MOD COMPLEXITY 2 8/2/2023 Celi Wilkerson OT GO 1                 Celi Wilkerson OT  8/2/2023

## 2023-08-02 NOTE — PROGRESS NOTES
"DAILY PROGRESS NOTE  [facility]    Patient Identification:  Name: Kristina Johnson  Age: 78 y.o.  Sex: female  :  1945  MRN: [id number]         Primary Care Physician: Moustapha Mata MD    Subjective:   Doing same with no new complaints and making good progress.    Interval History: follow up for pelvic fracture, fall     Objective:    Scheduled Meds:aspirin, 81 mg, Oral, Daily  atorvastatin, 10 mg, Oral, Nightly  DULoxetine, 30 mg, Oral, Daily  gabapentin, 800 mg, Oral, Q8H  insulin lispro, 2-9 Units, Subcutaneous, 4x Daily AC & at Bedtime  losartan, 100 mg, Oral, Q24H  pantoprazole, 40 mg, Oral, Q AM  senna-docusate sodium, 2 tablet, Oral, BID      Continuous Infusions:     Vital signs in last 24 hours:  Temp:  [98 øF (36.7 øC)-99.3 øF (37.4 øC)] 98.2 øF (36.8 øC)  Heart Rate:  [75-90] 80  Resp:  [16-18] 16  BP: (101-122)/(60-76) 116/73    Intake/Output:    Intake/Output Summary (Last 24 hours) at 2023 1509  Last data filed at 2023 1302  Gross per 24 hour   Intake 1140 ml   Output 1700 ml   Net -560 ml         Exam:  /73 (BP Location: Left arm, Patient Position: Sitting)   Pulse 80   Temp 98.2 øF (36.8 øC) (Oral)   Resp 16   Ht 152.4 cm (60\")   Wt 68 kg (150 lb)   SpO2 98%   BMI 29.29 kg/mý     General Appearance:    Alert, cooperative, no distress, AAOx3                          Head:    Normocephalic, without obvious abnormality, atraumatic                           Eyes:    PERRL, conjunctivae/corneas clear, EOM's intact, both eyes                         Throat:   Lips, tongue, gums normal; oral mucosa pink and moist                           Neck:   Supple, symmetrical, trachea midline, no JVD                         Lungs:    Clear to auscultation bilaterally, respirations unlabored                 Chest Wall:    No tenderness or deformity                          Heart:    Regular rate and rhythm, S1 and S2 normal, no murmur,no  rub or gallop                  " Abdomen:     Soft, nontender, bowel sounds active, no masses, no organomegaly                  Extremities:   Extremities normal, atraumatic, no cyanosis or edema                        Pulses:   Pulses palpable in all extremities                            Skin:   Skin is warm and dry,  no rashes or palpable lesions                    Data Review:  Lab Results (last 24 hours)       Procedure Component Value Units Date/Time    POC Glucose Once [[id number]]  (Abnormal) Collected: 08/02/23 1129    Specimen: Blood Updated: 08/02/23 1132     Glucose 148 mg/dL      Comment: Meter: [id number] : [id number] Stef GOMEZ       POC Glucose Once [[id number]]  (Abnormal) Collected: 08/02/23 0735    Specimen: Blood Updated: 08/02/23 0738     Glucose 159 mg/dL      Comment: Meter: [id number] : [id number] Stef Jacoboemckayla GOMEZ       TSH [[id number]]  (Normal) Collected: 08/02/23 0427    Specimen: Blood Updated: 08/02/23 0519     TSH 1.080 uIU/mL     Comprehensive Metabolic Panel [[id number]]  (Abnormal) Collected: 08/02/23 0427    Specimen: Blood Updated: 08/02/23 0513     Glucose 181 mg/dL      BUN 15 mg/dL      Creatinine 1.13 mg/dL      Sodium 141 mmol/L      Potassium 4.6 mmol/L      Chloride 111 mmol/L      CO2 22.0 mmol/L      Calcium 8.8 mg/dL      Total Protein 5.4 g/dL      Albumin 3.2 g/dL      ALT (SGPT) 20 U/L      AST (SGOT) 22 U/L      Alkaline Phosphatase 32 U/L      Total Bilirubin 0.4 mg/dL      Globulin 2.2 gm/dL      A/G Ratio 1.5 g/dL      BUN/Creatinine Ratio 13.3     Anion Gap 8.0 mmol/L      eGFR 50.5 mL/min/1.73     Narrative:      GFR Normal >60  Chronic Kidney Disease <60  Kidney Failure <15    The GFR formula is only valid for adults with stable renal function between ages 18 and 70.    Lipid Panel [[id number]] Collected: 08/02/23 0427    Specimen: Blood Updated: 08/02/23 0513     Total Cholesterol 141 mg/dL      Triglycerides 107 mg/dL      HDL Cholesterol 51 mg/dL      LDL Cholesterol  70 mg/dL       VLDL Cholesterol 20 mg/dL      LDL/HDL Ratio 1.35    Narrative:      Cholesterol Reference Ranges  (U.S. Department of Health and Human Services ATP III Classifications)    Desirable          <200 mg/dL  Borderline High    200-239 mg/dL  High Risk          >240 mg/dL      Triglyceride Reference Ranges  (U.S. Department of Health and Human Services ATP III Classifications)    Normal           <150 mg/dL  Borderline High  150-199 mg/dL  High             200-499 mg/dL  Very High        >500 mg/dL    HDL Reference Ranges  (U.S. Department of Health and Human Services ATP III Classifications)    Low     <40 mg/dl (major risk factor for CHD)  High    >60 mg/dl ('negative' risk factor for CHD)        LDL Reference Ranges  (U.S. Department of Health and Human Services ATP III Classifications)    Optimal          <100 mg/dL  Near Optimal     100-129 mg/dL  Borderline High  130-159 mg/dL  High             160-189 mg/dL  Very High        >189 mg/dL    Hemoglobin A1c [[id number]]  (Abnormal) Collected: 08/02/23 0427    Specimen: Blood Updated: 08/02/23 0506     Hemoglobin A1C 7.10 %     Narrative:      Hemoglobin A1C Ranges:    Increased Risk for Diabetes  5.7% to 6.4%  Diabetes                     >= 6.5%  Diabetic Goal                < 7.0%    CBC & Differential [[id number]]  (Abnormal) Collected: 08/02/23 0427    Specimen: Blood Updated: 08/02/23 0457    Narrative:      The following orders were created for panel order CBC & Differential.  Procedure                               Abnormality         Status                     ---------                               -----------         ------                     CBC Auto Differential[[id number]]        Abnormal            Final result                 Please view results for these tests on the individual orders.    CBC Auto Differential [[id number]]  (Abnormal) Collected: 08/02/23 0427    Specimen: Blood Updated: 08/02/23 0457     WBC 7.44 10*3/mm3      RBC 3.73 10*6/mm3       Hemoglobin 11.5 g/dL      Hematocrit 34.5 %      MCV 92.5 fL      MCH 30.8 pg      MCHC 33.3 g/dL      RDW 12.2 %      RDW-SD 41.0 fl      MPV 10.2 fL      Platelets 145 10*3/mm3      Neutrophil % 74.8 %      Lymphocyte % 14.9 %      Monocyte % 7.0 %      Eosinophil % 2.0 %      Basophil % 0.4 %      Immature Grans % 0.9 %      Neutrophils, Absolute 5.56 10*3/mm3      Lymphocytes, Absolute 1.11 10*3/mm3      Monocytes, Absolute 0.52 10*3/mm3      Eosinophils, Absolute 0.15 10*3/mm3      Basophils, Absolute 0.03 10*3/mm3      Immature Grans, Absolute 0.07 10*3/mm3      nRBC 0.0 /100 WBC     POC Glucose Once [[id number]]  (Abnormal) Collected: 08/01/23 2045    Specimen: Blood Updated: 08/01/23 2046     Glucose 178 mg/dL      Comment: Meter: [id number] : [id number] Zkaiya GOMEZ       BNP [[id number]]  (Normal) Collected: 08/01/23 1709    Specimen: Blood Updated: 08/01/23 1757     proBNP 140.0 pg/mL     Narrative:      Among patients with dyspnea, NT-proBNP is highly sensitive for the detection of acute congestive heart failure. In addition NT-proBNP of <300 pg/ml effectively rules out acute congestive heart failure with 99% negative predictive value.    Results may be falsely decreased if patient taking Biotin.      POC Glucose Once [[id number]]  (Abnormal) Collected: 08/01/23 1651    Specimen: Blood Updated: 08/01/23 1652     Glucose 133 mg/dL      Comment: Meter: [id number] : [id number] Nic GOMEZ             ASSESSMENT  Left pubic superior inferior ramus fracture  Diabetes mellitus  Hypertension  Hyperlipidemia  Neuropathy  Chronic kidney disease stage III    PLAN  CPM  Nonoperative treatment  Orthopedic surgery input appreciated  Continue home medications  Stress ulcer DVT prophylaxis  Supportive care  PT OT  Discussed with family and nursing staff  Discharge to subacute rehab once bed available and approved by insurance      Pedro Escobar MD  8/2/2023  15:09 EDT    Copied text in this note has  been reviewed and is accurate as of 08/02/23

## 2023-08-02 NOTE — PLAN OF CARE
Goal Outcome Evaluation:  Plan of Care Reviewed With: patient        Progress: improving  Outcome Evaluation: Pt received in bed upon arrival and agreeable to PT. Pt required increased time and min A for L LE assist to reach sitting EOB. Pt stood x 3 (from EOB, BSC, and chair) requiring mod A. Pt was able to take steps to BSC & then chair c RW requiring CGA. Pt demo's a very slow shuffling gait with difficulty clearing B LE secondary to pain. Pt fatigues quickly with activity. Pt UIC and encouraged to complete HEP and transfer for toileting needs with staff. Pt plans to D/C to rehab soon.      Anticipated Discharge Disposition (PT): skilled nursing facility

## 2023-08-02 NOTE — CASE MANAGEMENT/SOCIAL WORK
Continued Stay Note  [facility]     Patient Name: Kristina Johnson  MRN: [id number]  Today's Date: 8/2/2023    Admit Date: 7/30/2023    Plan: [facility] [place] [facility] -- Accepted and precert pending   Discharge Plan       Row Name 08/02/23 1657       Plan    Plan [facility] [place] -- Accepted and precert pending    Plan Comments [employer] portal is malfunctioning.  All documentation faxed to [facility] for precert.  Await determination.  [employer] notified and will attempt to assist.  Marina ROSAS      Row Name            Plan     Patient/Family in Agreement with Plan     Plan Comments                    Discharge Codes    No documentation.                 Expected Discharge Date and Time       Expected Discharge Date Expected Discharge Time    Aug 3, 2023               Marina Gardner RN

## 2023-08-03 VITALS
TEMPERATURE: 98.2 F | HEIGHT: 60 IN | HEART RATE: 86 BPM | BODY MASS INDEX: 29.45 KG/M2 | RESPIRATION RATE: 16 BRPM | SYSTOLIC BLOOD PRESSURE: 117 MMHG | WEIGHT: 150 LBS | DIASTOLIC BLOOD PRESSURE: 64 MMHG | OXYGEN SATURATION: 98 %

## 2023-08-03 LAB
ANION GAP SERPL CALCULATED.3IONS-SCNC: 6.9 MMOL/L (ref 5–15)
BASOPHILS # BLD AUTO: 0.02 10*3/MM3 (ref 0–0.2)
BASOPHILS NFR BLD AUTO: 0.3 % (ref 0–1.5)
BUN SERPL-MCNC: 20 MG/DL (ref 8–23)
BUN/CREAT SERPL: 18.9 (ref 7–25)
CALCIUM SPEC-SCNC: 8.5 MG/DL (ref 8.6–10.5)
CHLORIDE SERPL-SCNC: 113 MMOL/L (ref 98–107)
CO2 SERPL-SCNC: 23.1 MMOL/L (ref 22–29)
CREAT SERPL-MCNC: 1.06 MG/DL (ref 0.57–1)
DEPRECATED RDW RBC AUTO: 40.5 FL (ref 37–54)
EGFRCR SERPLBLD CKD-EPI 2021: 54.6 ML/MIN/1.73
EOSINOPHIL # BLD AUTO: 0.29 10*3/MM3 (ref 0–0.4)
EOSINOPHIL NFR BLD AUTO: 4.4 % (ref 0.3–6.2)
ERYTHROCYTE [DISTWIDTH] IN BLOOD BY AUTOMATED COUNT: 12.1 % (ref 12.3–15.4)
GLUCOSE BLDC GLUCOMTR-MCNC: 176 MG/DL (ref 70–130)
GLUCOSE BLDC GLUCOMTR-MCNC: 187 MG/DL (ref 70–130)
GLUCOSE BLDC GLUCOMTR-MCNC: 227 MG/DL (ref 70–130)
GLUCOSE SERPL-MCNC: 165 MG/DL (ref 65–99)
HCT VFR BLD AUTO: 33.1 % (ref 34–46.6)
HGB BLD-MCNC: 10.8 G/DL (ref 12–15.9)
IMM GRANULOCYTES # BLD AUTO: 0.05 10*3/MM3 (ref 0–0.05)
IMM GRANULOCYTES NFR BLD AUTO: 0.8 % (ref 0–0.5)
LYMPHOCYTES # BLD AUTO: 1.93 10*3/MM3 (ref 0.7–3.1)
LYMPHOCYTES NFR BLD AUTO: 29.2 % (ref 19.6–45.3)
MCH RBC QN AUTO: 30.1 PG (ref 26.6–33)
MCHC RBC AUTO-ENTMCNC: 32.6 G/DL (ref 31.5–35.7)
MCV RBC AUTO: 92.2 FL (ref 79–97)
MONOCYTES # BLD AUTO: 0.53 10*3/MM3 (ref 0.1–0.9)
MONOCYTES NFR BLD AUTO: 8 % (ref 5–12)
NEUTROPHILS NFR BLD AUTO: 3.78 10*3/MM3 (ref 1.7–7)
NEUTROPHILS NFR BLD AUTO: 57.3 % (ref 42.7–76)
NRBC BLD AUTO-RTO: 0 /100 WBC (ref 0–0.2)
PLATELET # BLD AUTO: 148 10*3/MM3 (ref 140–450)
PMV BLD AUTO: 10.4 FL (ref 6–12)
POTASSIUM SERPL-SCNC: 4.4 MMOL/L (ref 3.5–5.2)
RBC # BLD AUTO: 3.59 10*6/MM3 (ref 3.77–5.28)
SODIUM SERPL-SCNC: 143 MMOL/L (ref 136–145)
WBC NRBC COR # BLD: 6.6 10*3/MM3 (ref 3.4–10.8)

## 2023-08-03 PROCEDURE — 85025 COMPLETE CBC W/AUTO DIFF WBC: CPT | Performed by: HOSPITALIST

## 2023-08-03 PROCEDURE — 63710000001 INSULIN LISPRO (HUMAN) PER 5 UNITS: Performed by: INTERNAL MEDICINE

## 2023-08-03 PROCEDURE — 82948 REAGENT STRIP/BLOOD GLUCOSE: CPT

## 2023-08-03 PROCEDURE — G0378 HOSPITAL OBSERVATION PER HR: HCPCS

## 2023-08-03 PROCEDURE — 80048 BASIC METABOLIC PNL TOTAL CA: CPT | Performed by: HOSPITALIST

## 2023-08-03 RX ORDER — PANTOPRAZOLE SODIUM 40 MG/1
40 TABLET, DELAYED RELEASE ORAL
Qty: 30 TABLET | Refills: 0 | Status: SHIPPED | OUTPATIENT
Start: 2023-08-04 | End: 2023-09-03

## 2023-08-03 RX ORDER — AMOXICILLIN 250 MG
2 CAPSULE ORAL 2 TIMES DAILY
Qty: 120 TABLET | Refills: 0 | Status: SHIPPED | OUTPATIENT
Start: 2023-08-03 | End: 2023-09-02

## 2023-08-03 RX ORDER — HYDROCODONE BITARTRATE AND ACETAMINOPHEN 5; 325 MG/1; MG/1
1 TABLET ORAL EVERY 4 HOURS PRN
Qty: 10 TABLET | Refills: 0 | Status: SHIPPED | OUTPATIENT
Start: 2023-08-03 | End: 2023-08-06

## 2023-08-03 RX ORDER — GABAPENTIN 800 MG/1
800 TABLET ORAL 3 TIMES DAILY
Qty: 9 TABLET | Refills: 0 | Status: SHIPPED | OUTPATIENT
Start: 2023-08-03 | End: 2023-08-06

## 2023-08-03 RX ORDER — ASPIRIN 81 MG/1
81 TABLET, CHEWABLE ORAL DAILY
Qty: 30 TABLET | Refills: 0 | Status: SHIPPED | OUTPATIENT
Start: 2023-08-04 | End: 2023-09-03

## 2023-08-03 RX ADMIN — INSULIN LISPRO 4 UNITS: 100 INJECTION, SOLUTION INTRAVENOUS; SUBCUTANEOUS at 16:36

## 2023-08-03 RX ADMIN — INSULIN LISPRO 2 UNITS: 100 INJECTION, SOLUTION INTRAVENOUS; SUBCUTANEOUS at 12:14

## 2023-08-03 RX ADMIN — DULOXETINE HYDROCHLORIDE 30 MG: 30 CAPSULE, DELAYED RELEASE ORAL at 08:50

## 2023-08-03 RX ADMIN — GABAPENTIN 800 MG: 400 CAPSULE ORAL at 14:40

## 2023-08-03 RX ADMIN — INSULIN LISPRO 2 UNITS: 100 INJECTION, SOLUTION INTRAVENOUS; SUBCUTANEOUS at 07:37

## 2023-08-03 RX ADMIN — PANTOPRAZOLE SODIUM 40 MG: 40 TABLET, DELAYED RELEASE ORAL at 06:02

## 2023-08-03 RX ADMIN — GABAPENTIN 800 MG: 400 CAPSULE ORAL at 06:02

## 2023-08-03 RX ADMIN — HYDROCODONE BITARTRATE AND ACETAMINOPHEN 1 TABLET: 5; 325 TABLET ORAL at 08:49

## 2023-08-03 RX ADMIN — ASPIRIN 81 MG: 81 TABLET, CHEWABLE ORAL at 08:50

## 2023-08-03 NOTE — PLAN OF CARE
Goal Outcome Evaluation:      Attempted to call report x 2. 1st time staff asked to call us back. 2nd call remained on hold and no one ever came to take call.

## 2023-08-03 NOTE — DISCHARGE SUMMARY
Discharge summary    Date of admission 7/30/2023  Date of discharge 8/3/2023    Final diagnosis  Left pubic superior inferior ramus fracture  Diabetes mellitus  Hypertension  Hyperlipidemia  Neuropathy  Chronic kidney disease stage III    Discharge medications    Current Facility-Administered Medications:     aspirin chewable tablet 81 mg, 81 mg, Oral, Daily, Pedro Escobar MD, 81 mg at 08/03/23 0850    atorvastatin (LIPITOR) tablet 10 mg, 10 mg, Oral, Nightly, Pedro Escobar MD, 10 mg at 08/02/23 2053    DULoxetine (CYMBALTA) DR capsule 30 mg, 30 mg, Oral, Daily, Silvia Mckeon MD, 30 mg at 08/03/23 0850    gabapentin (NEURONTIN) capsule 800 mg, 800 mg, Oral, Q8H, Silvia Mckeon MD, 800 mg at 08/03/23 0602    HYDROcodone-acetaminophen (NORCO) 5-325 MG per tablet 1 tablet, 1 tablet, Oral, Q4H PRN, Silvia Mckeon MD, 1 tablet at 08/03/23 0849    insulin lispro (HUMALOG/ADMELOG) injection 2-9 Units, 2-9 Units, Subcutaneous, 4x Daily AC & at Bedtime, Silvia Mckeon MD, 2 Units at 08/03/23 1214    losartan (COZAAR) tablet 100 mg, 100 mg, Oral, Q24H, Silvia Mckeon MD, 100 mg at 08/01/23 0838    pantoprazole (PROTONIX) EC tablet 40 mg, 40 mg, Oral, Q AM, Pedro Escobar MD, 40 mg at 08/03/23 0602    sennosides-docusate (PERICOLACE) 8.6-50 MG per tablet 2 tablet, 2 tablet, Oral, BID, 2 tablet at 08/01/23 0838 **AND** [DISCONTINUED] polyethylene glycol (MIRALAX) packet 17 g, 17 g, Oral, Daily PRN **AND** [DISCONTINUED] bisacodyl (DULCOLAX) EC tablet 5 mg, 5 mg, Oral, Daily PRN **AND** [DISCONTINUED] bisacodyl (DULCOLAX) suppository 10 mg, 10 mg, Rectal, Daily PRN, Silvia Mckeon MD    [COMPLETED] Insert Peripheral IV, , , Once **AND** sodium chloride 0.9 % flush 10 mL, 10 mL, Intravenous, PRN, Aubrey Simpson MD     Consults obtained  Orthopedic surgery    Procedures  None    Hospital course  78-year-old white female with multiple medical problems including diabetes mellitus  hypertension hyperlipidemia neuropathy and chronic kidney disease stage III admitted to emergency room after the mechanical fall with left hip and pelvic pain.  Patient work-up in ER revealed left pubic superior and inferior ramus fracture admitted for management.  Patient admitted stabilized with bedrest pain management and further evaluated by orthopedic surgery and recommend nonoperative treatment.  Patient working with PT OT and pain well controlled and medically stable and agreeable to subacute rehab.  Patient cleared for discharge.    Discharge diet regular    Activity per PT OT    Medication as above    Further care per accepting physician at subacute rehab and follow-up with primary doctor and orthopedic surgery as needed and take medication as directed.      TOI PABON MD

## 2023-08-03 NOTE — PROGRESS NOTES
"DAILY PROGRESS NOTE  [facility]    Patient Identification:  Name: Kristina Johnson  Age: 78 y.o.  Sex: female  :  1945  MRN: [id number]         Primary Care Physician: Moustapha Mata MD    Subjective:   Doing same with no new complaints     Interval History: follow up for pelvic fracture, fall     Objective:    Scheduled Meds:aspirin, 81 mg, Oral, Daily  atorvastatin, 10 mg, Oral, Nightly  DULoxetine, 30 mg, Oral, Daily  gabapentin, 800 mg, Oral, Q8H  insulin lispro, 2-9 Units, Subcutaneous, 4x Daily AC & at Bedtime  losartan, 100 mg, Oral, Q24H  pantoprazole, 40 mg, Oral, Q AM  senna-docusate sodium, 2 tablet, Oral, BID      Continuous Infusions:     Vital signs in last 24 hours:  Temp:  [97.9 øF (36.6 øC)-98.2 øF (36.8 øC)] 98.2 øF (36.8 øC)  Heart Rate:  [83-91] 86  Resp:  [16] 16  BP: ()/(58-69) 117/64    Intake/Output:    Intake/Output Summary (Last 24 hours) at 8/3/2023 1416  Last data filed at 8/3/2023 1324  Gross per 24 hour   Intake 1620 ml   Output 1850 ml   Net -230 ml         Exam:  /64 (BP Location: Left arm, Patient Position: Lying)   Pulse 86   Temp 98.2 øF (36.8 øC) (Oral)   Resp 16   Ht 152.4 cm (60\")   Wt 68 kg (150 lb)   SpO2 98%   BMI 29.29 kg/mý     General Appearance:    Alert, cooperative, no distress, AAOx3                          Head:    Normocephalic, without obvious abnormality, atraumatic                           Eyes:    PERRL, conjunctivae/corneas clear, EOM's intact, both eyes                         Throat:   Lips, tongue, gums normal; oral mucosa pink and moist                           Neck:   Supple, symmetrical, trachea midline, no JVD                         Lungs:    Clear to auscultation bilaterally, respirations unlabored                 Chest Wall:    No tenderness or deformity                          Heart:    Regular rate and rhythm, S1 and S2 normal, no murmur,no  rub or gallop                  Abdomen:     Soft, nontender, " bowel sounds active, no masses, no organomegaly                  Extremities:   Extremities normal, atraumatic, no cyanosis or edema                        Pulses:   Pulses palpable in all extremities                            Skin:   Skin is warm and dry,  no rashes or palpable lesions                    Data Review:  Lab Results (last 24 hours)       Procedure Component Value Units Date/Time    POC Glucose Once [[id number]]  (Abnormal) Collected: 08/03/23 1115    Specimen: Blood Updated: 08/03/23 1117     Glucose 187 mg/dL      Comment: Meter: [id number] : [id number] Stef GOMEZ       POC Glucose Once [[id number]]  (Abnormal) Collected: 08/03/23 0722    Specimen: Blood Updated: 08/03/23 0724     Glucose 176 mg/dL      Comment: Meter: [id number] : [id number] Stef GOMEZ       Basic Metabolic Panel [[id number]]  (Abnormal) Collected: 08/03/23 0430    Specimen: Blood Updated: 08/03/23 0526     Glucose 165 mg/dL      BUN 20 mg/dL      Creatinine 1.06 mg/dL      Sodium 143 mmol/L      Potassium 4.4 mmol/L      Chloride 113 mmol/L      CO2 23.1 mmol/L      Calcium 8.5 mg/dL      BUN/Creatinine Ratio 18.9     Anion Gap 6.9 mmol/L      eGFR 54.6 mL/min/1.73     Narrative:      GFR Normal >60  Chronic Kidney Disease <60  Kidney Failure <15    The GFR formula is only valid for adults with stable renal function between ages 18 and 70.    CBC & Differential [[id number]]  (Abnormal) Collected: 08/03/23 0430    Specimen: Blood Updated: 08/03/23 0507    Narrative:      The following orders were created for panel order CBC & Differential.  Procedure                               Abnormality         Status                     ---------                               -----------         ------                     CBC Auto Differential[[id number]]        Abnormal            Final result                 Please view results for these tests on the individual orders.    CBC Auto Differential [[id number]]  (Abnormal)  Collected: 08/03/23 0430    Specimen: Blood Updated: 08/03/23 0507     WBC 6.60 10*3/mm3      RBC 3.59 10*6/mm3      Hemoglobin 10.8 g/dL      Hematocrit 33.1 %      MCV 92.2 fL      MCH 30.1 pg      MCHC 32.6 g/dL      RDW 12.1 %      RDW-SD 40.5 fl      MPV 10.4 fL      Platelets 148 10*3/mm3      Neutrophil % 57.3 %      Lymphocyte % 29.2 %      Monocyte % 8.0 %      Eosinophil % 4.4 %      Basophil % 0.3 %      Immature Grans % 0.8 %      Neutrophils, Absolute 3.78 10*3/mm3      Lymphocytes, Absolute 1.93 10*3/mm3      Monocytes, Absolute 0.53 10*3/mm3      Eosinophils, Absolute 0.29 10*3/mm3      Basophils, Absolute 0.02 10*3/mm3      Immature Grans, Absolute 0.05 10*3/mm3      nRBC 0.0 /100 WBC     POC Glucose Once [[id number]]  (Abnormal) Collected: 08/02/23 2058    Specimen: Blood Updated: 08/02/23 2059     Glucose 215 mg/dL      Comment: Meter: [id number] : [id number] Zakiya Garay NA       POC Glucose Once [[id number]]  (Abnormal) Collected: 08/02/23 1610    Specimen: Blood Updated: 08/02/23 1611     Glucose 163 mg/dL      Comment: Meter: [id number] : [id number] Stef GOMEZ             ASSESSMENT  Left pubic superior inferior ramus fracture  Diabetes mellitus  Hypertension  Hyperlipidemia  Neuropathy  Chronic kidney disease stage III    PLAN  Discharge to subacute rehab  Discharge summary dictated      Pedro Escobar MD  8/3/2023  14:16 EDT    Copied text in this note has been reviewed and is accurate as of 08/03/23

## 2023-08-03 NOTE — CASE MANAGEMENT/SOCIAL WORK
Continued Stay Note  [facility]     Patient Name: Kristina Johnson  MRN: [id number]  Today's Date: 8/3/2023    Admit Date: 7/30/2023    Plan: [facility] SNF via [facility] shaffer @ 6pm this evening.   Discharge Plan       Row Name 08/03/23 1718       Plan    Plan Comments Approval letter faxed to [employer] and also to liamelissa Viveros.  Spoke with Jackeline and all is good for [facility] today at 6pm.  Marina ROSAS                   Discharge Codes    No documentation.                 Expected Discharge Date and Time       Expected Discharge Date Expected Discharge Time    Aug 3, 2023               Marina Gardner RN

## 2023-08-03 NOTE — PLAN OF CARE
Goal Outcome Evaluation:              Outcome Evaluation: Pt pleasant and cooperative with care. Sat in the recliner for a an hourand was compliant to call for assistance to get back in bed. No complaint of pain. Pt is looking forward for possible dc pending precert approval.

## 2023-08-03 NOTE — CASE MANAGEMENT/SOCIAL WORK
Continued Stay Note  [facility]     Patient Name: Kristina Johnson  MRN: [id number]  Today's Date: 8/3/2023    Admit Date: 7/30/2023    Plan: [facility] SNF via [employer] [facility] van @ 6pm this evening.   Discharge Plan       Row Name 08/03/23 1336       Plan    Plan [facility] SNF via [employer] [facility] van @ 6pm this evening.    Plan Comments CCP received an inbound call from Marina with Case Management stating they have received a verbal approval from [facility] for patient to admit to [facility] today. Marina asked CCP to book transport as late as possible this evening. CCP spoke to [facility] this date and booked a wheelchair van at 6pm this evening. Reservation # [id number]. CCP informed patient's nurse and Marina of transportation arrangements. CCP spoke to Elana/[facility] and asked her to inform Dr. Escobar of discharge plans/transportation arrangements. CCP to follow.                   Discharge Codes    No documentation.                 Expected Discharge Date and Time       Expected Discharge Date Expected Discharge Time    Aug 3, 2023

## 2023-08-04 NOTE — CASE MANAGEMENT/SOCIAL WORK
Case Management Discharge Note      Final Note: dc'd to [facility] skilled bed via [facility] w/c deena         Selected Continued Care - Discharged on 8/3/2023 Admission date: 7/30/2023 - Discharge disposition: Skilled Nursing Facility (DC - External)      Destination Coordination complete.      Service Provider Selected Services Address Phone Fax Patient Preferred    [facility] [address] [address] [phone] --              Durable Medical Equipment    No services have been selected for the patient.                Dialysis/Infusion    No services have been selected for the patient.                Home Medical Care Coordination complete.      Service Provider Selected Services Address Phone Fax Patient Preferred    [facility] [address] [address] [phone] --              Therapy    No services have been selected for the patient.                Community Resources    No services have been selected for the patient.                Community & DME    No services have been selected for the patient.                    Transportation Services  [facility]    Final Discharge Disposition Code: 03 - skilled nursing facility (SNF)

## 2023-08-18 ENCOUNTER — TRANSCRIBE ORDERS (OUTPATIENT)
Dept: HOME HEALTH SERVICES | Facility: HOME HEALTHCARE | Age: 78
End: 2023-08-18
Payer: MEDICARE

## 2023-08-18 ENCOUNTER — HOME HEALTH ADMISSION (OUTPATIENT)
Dept: HOME HEALTH SERVICES | Facility: HOME HEALTHCARE | Age: 78
End: 2023-08-18
Payer: COMMERCIAL

## 2023-08-18 DIAGNOSIS — S32.110D: Primary | ICD-10-CM

## 2023-08-19 ENCOUNTER — HOME CARE VISIT (OUTPATIENT)
Dept: HOME HEALTH SERVICES | Facility: HOME HEALTHCARE | Age: 78
End: 2023-08-19
Payer: COMMERCIAL

## 2023-08-19 ENCOUNTER — HOME CARE VISIT (OUTPATIENT)
Dept: HOME HEALTH SERVICES | Facility: HOME HEALTHCARE | Age: 78
End: 2023-08-19
Payer: MEDICARE

## 2023-08-19 VITALS
DIASTOLIC BLOOD PRESSURE: 70 MMHG | HEART RATE: 84 BPM | SYSTOLIC BLOOD PRESSURE: 112 MMHG | RESPIRATION RATE: 16 BRPM | OXYGEN SATURATION: 98 %

## 2023-08-19 PROCEDURE — G0151 HHCP-SERV OF PT,EA 15 MIN: HCPCS

## 2023-08-19 NOTE — HOME HEALTH
"Physical Therapy Evaluation   Diagnosis: left superior ramus fracture  Focus of Care: left superior ramus fracture  Surgical Procedure and date: n/a  Pertinent Medical History: see epic, fall with fracture [facility] 7/30/23 to 8/3/23 and then [place] [facility] [place]    Prior level of function:   Ambulation: indep[endent with no device   Activities of daily living: independent   Instrumental activities of daily living: independent   Driving: drives   Other/ Hobbies/Work:   colors    Home Environment:  lives alone in apartment in [facility]  Goal for Physical Therapy: \"to be able to walk without anything and to do whatever I want to do\"  Skilled Physical Therapy indicated for instruction in gait, exercise, education and home safety.      Plan for next visit: review supine exercises and add standing exercises, walk in hallways"

## 2023-08-19 NOTE — CASE COMMUNICATION
Patient fall in living room and sustained pelvic fracture 7/30/23.  Went to [facility] 7/30-8/3/23 and then transferred to [facility].  D/c home 8/18/23.  She will be 1w1, 2w3 for HH PT.  Lives alone in [place] [facility].  Has a friend assisting her as needed. Patient home with all medications.  Walking with walker in apartment and hallways.  Reviewed HEP from [facility] and will modify as needed.  Patient's goal is to get back to walking  with no device and caring for herself.

## 2023-08-21 ENCOUNTER — HOME CARE VISIT (OUTPATIENT)
Dept: HOME HEALTH SERVICES | Facility: HOME HEALTHCARE | Age: 78
End: 2023-08-21
Payer: COMMERCIAL

## 2023-08-21 VITALS
SYSTOLIC BLOOD PRESSURE: 110 MMHG | HEART RATE: 91 BPM | RESPIRATION RATE: 16 BRPM | OXYGEN SATURATION: 96 % | DIASTOLIC BLOOD PRESSURE: 72 MMHG

## 2023-08-21 PROCEDURE — G0151 HHCP-SERV OF PT,EA 15 MIN: HCPCS

## 2023-08-21 NOTE — HOME HEALTH
Plan for next visit: review standing exercises    Subjective: Yesterday I walked around the building with my niece.  I also cleaned the bathroom.  I think I did too much.  I have more pain today.    Falls since last visit: none  Home/Social Environment: lives in [facility] [place] alone, friend assisting as needed

## 2023-08-25 ENCOUNTER — HOME CARE VISIT (OUTPATIENT)
Dept: HOME HEALTH SERVICES | Facility: HOME HEALTHCARE | Age: 78
End: 2023-08-25
Payer: COMMERCIAL

## 2023-08-25 VITALS
HEART RATE: 82 BPM | RESPIRATION RATE: 16 BRPM | DIASTOLIC BLOOD PRESSURE: 78 MMHG | OXYGEN SATURATION: 98 % | SYSTOLIC BLOOD PRESSURE: 122 MMHG

## 2023-08-25 PROCEDURE — G0151 HHCP-SERV OF PT,EA 15 MIN: HCPCS

## 2023-08-25 NOTE — HOME HEALTH
Plan for next visit: walk with cane, add step taps and side stepping    Subjective: My knee and back are hurting.  It's just my arthritis.  My hip is OK.  I have a UTI.  My doctor gave me antibiotics.      Falls since last visit: none  Home/Social Environment: lives in [place] [place] [place] [facility] alone, friend assisting as needed

## 2023-08-28 ENCOUNTER — HOME CARE VISIT (OUTPATIENT)
Dept: HOME HEALTH SERVICES | Facility: HOME HEALTHCARE | Age: 78
End: 2023-08-28
Payer: MEDICARE

## 2023-08-28 VITALS
DIASTOLIC BLOOD PRESSURE: 68 MMHG | OXYGEN SATURATION: 99 % | HEART RATE: 104 BPM | RESPIRATION RATE: 16 BRPM | SYSTOLIC BLOOD PRESSURE: 102 MMHG

## 2023-08-28 PROCEDURE — G0151 HHCP-SERV OF PT,EA 15 MIN: HCPCS

## 2023-08-28 NOTE — HOME HEALTH
Plan for next visit: review cane and exercises    Subjective: I even slept on my left side this weekend.  This is something I have not been able to do.  My knee and back are good today.  I woke up in no pain.  Falls since last visit: none  Home/Social Environment: lives in [facility] alone, friend assisting as needed

## 2023-08-30 ENCOUNTER — HOME CARE VISIT (OUTPATIENT)
Dept: HOME HEALTH SERVICES | Facility: HOME HEALTHCARE | Age: 78
End: 2023-08-30
Payer: COMMERCIAL

## 2023-08-30 VITALS
RESPIRATION RATE: 16 BRPM | DIASTOLIC BLOOD PRESSURE: 82 MMHG | SYSTOLIC BLOOD PRESSURE: 140 MMHG | OXYGEN SATURATION: 99 % | HEART RATE: 77 BPM

## 2023-08-30 PROCEDURE — G0151 HHCP-SERV OF PT,EA 15 MIN: HCPCS

## 2023-08-30 NOTE — HOME HEALTH
Subjective: No pain.  Not even in my knee.    Falls since last visit: none  Home/Social Environment: lives in [facility] alone, friend assisting as needed

## 2023-09-06 ENCOUNTER — HOME CARE VISIT (OUTPATIENT)
Dept: HOME HEALTH SERVICES | Facility: HOME HEALTHCARE | Age: 78
End: 2023-09-06
Payer: COMMERCIAL

## 2023-09-06 VITALS
DIASTOLIC BLOOD PRESSURE: 74 MMHG | SYSTOLIC BLOOD PRESSURE: 110 MMHG | HEART RATE: 93 BPM | OXYGEN SATURATION: 97 % | TEMPERATURE: 96.8 F | RESPIRATION RATE: 16 BRPM

## 2023-09-06 PROCEDURE — G0151 HHCP-SERV OF PT,EA 15 MIN: HCPCS

## 2023-09-06 NOTE — HOME HEALTH
Plan for next visit: [facility] discharge    Subjective: I'm good had a long weekend.      Falls since last visit: none  Home/Social Environment: lives in [facility] alone, friend assisting as needed

## 2023-09-08 ENCOUNTER — HOME CARE VISIT (OUTPATIENT)
Dept: HOME HEALTH SERVICES | Facility: HOME HEALTHCARE | Age: 78
End: 2023-09-08
Payer: MEDICARE

## 2023-09-08 VITALS
SYSTOLIC BLOOD PRESSURE: 122 MMHG | DIASTOLIC BLOOD PRESSURE: 80 MMHG | RESPIRATION RATE: 16 BRPM | HEART RATE: 81 BPM | OXYGEN SATURATION: 98 %

## 2023-09-08 PROCEDURE — G0151 HHCP-SERV OF PT,EA 15 MIN: HCPCS

## 2023-09-08 NOTE — HOME HEALTH
Subjective: I'm doing fine.        Falls since last visit: none  Home/Social Environment: lives in [facility] alone, friend assisting as needed

## 2025-04-21 ENCOUNTER — TRANSCRIBE ORDERS (OUTPATIENT)
Dept: ADMINISTRATIVE | Facility: HOSPITAL | Age: 80
End: 2025-04-21
Payer: MEDICARE

## 2025-04-21 DIAGNOSIS — M85.89 OSTEOPENIA OF MULTIPLE SITES: Primary | ICD-10-CM

## 2025-04-21 DIAGNOSIS — Z12.31 VISIT FOR SCREENING MAMMOGRAM: Primary | ICD-10-CM

## 2025-06-11 ENCOUNTER — HOSPITAL ENCOUNTER (OUTPATIENT)
Dept: MAMMOGRAPHY | Facility: HOSPITAL | Age: 80
Discharge: HOME | End: 2025-06-11
Payer: COMMERCIAL